# Patient Record
Sex: FEMALE | Race: OTHER | Employment: FULL TIME | URBAN - METROPOLITAN AREA
[De-identification: names, ages, dates, MRNs, and addresses within clinical notes are randomized per-mention and may not be internally consistent; named-entity substitution may affect disease eponyms.]

---

## 2022-06-09 ENCOUNTER — HOSPITAL ENCOUNTER (EMERGENCY)
Age: 22
Discharge: HOME | End: 2022-06-09
Attending: EMERGENCY MEDICINE

## 2022-06-09 VITALS
SYSTOLIC BLOOD PRESSURE: 122 MMHG | DIASTOLIC BLOOD PRESSURE: 72 MMHG | HEART RATE: 72 BPM | RESPIRATION RATE: 16 BRPM | OXYGEN SATURATION: 100 % | WEIGHT: 136.91 LBS | BODY MASS INDEX: 24.26 KG/M2 | TEMPERATURE: 97.7 F | HEIGHT: 63 IN

## 2022-06-09 DIAGNOSIS — N30.90 CYSTITIS: Primary | ICD-10-CM

## 2022-06-09 LAB
ALBUMIN SERPL-MCNC: 3.6 G/DL (ref 3.5–5)
ALBUMIN/GLOB SERPL: 0.8 {RATIO} (ref 1.1–2.2)
ALP SERPL-CCNC: 123 U/L (ref 45–117)
ALT SERPL-CCNC: 23 U/L (ref 12–78)
ANION GAP SERPL CALC-SCNC: 6 MMOL/L (ref 5–15)
APPEARANCE UR: ABNORMAL
AST SERPL-CCNC: 45 U/L (ref 15–37)
BACTERIA URNS QL MICRO: NEGATIVE /HPF
BASOPHILS # BLD: 0.1 K/UL (ref 0–0.1)
BASOPHILS NFR BLD: 1 % (ref 0–1)
BILIRUB SERPL-MCNC: 0.5 MG/DL (ref 0.2–1)
BILIRUB UR QL: NEGATIVE
BUN SERPL-MCNC: 9 MG/DL (ref 6–20)
BUN/CREAT SERPL: 15 (ref 12–20)
CALCIUM SERPL-MCNC: 9.1 MG/DL (ref 8.5–10.1)
CHLORIDE SERPL-SCNC: 106 MMOL/L (ref 97–108)
CO2 SERPL-SCNC: 25 MMOL/L (ref 21–32)
COLOR UR: ABNORMAL
CREAT SERPL-MCNC: 0.6 MG/DL (ref 0.55–1.02)
DIFFERENTIAL METHOD BLD: ABNORMAL
EOSINOPHIL # BLD: 0.2 K/UL (ref 0–0.4)
EOSINOPHIL NFR BLD: 2 % (ref 0–7)
EPITH CASTS URNS QL MICRO: ABNORMAL /LPF
ERYTHROCYTE [DISTWIDTH] IN BLOOD BY AUTOMATED COUNT: 17 % (ref 11.5–14.5)
GLOBULIN SER CALC-MCNC: 4.5 G/DL (ref 2–4)
GLUCOSE SERPL-MCNC: 91 MG/DL (ref 65–100)
GLUCOSE UR STRIP.AUTO-MCNC: NEGATIVE MG/DL
HCG SERPL QL: NEGATIVE
HCG SERPL-ACNC: <1 MIU/ML (ref 0–6)
HCT VFR BLD AUTO: 35.1 % (ref 35–47)
HGB BLD-MCNC: 10.1 G/DL (ref 11.5–16)
HGB UR QL STRIP: ABNORMAL
HYALINE CASTS URNS QL MICRO: ABNORMAL /LPF (ref 0–2)
IMM GRANULOCYTES # BLD AUTO: 0 K/UL (ref 0–0.04)
IMM GRANULOCYTES NFR BLD AUTO: 0 % (ref 0–0.5)
KETONES UR QL STRIP.AUTO: NEGATIVE MG/DL
LEUKOCYTE ESTERASE UR QL STRIP.AUTO: ABNORMAL
LIPASE SERPL-CCNC: 127 U/L (ref 73–393)
LYMPHOCYTES # BLD: 1.7 K/UL (ref 0.8–3.5)
LYMPHOCYTES NFR BLD: 23 % (ref 12–49)
MCH RBC QN AUTO: 19.2 PG (ref 26–34)
MCHC RBC AUTO-ENTMCNC: 28.8 G/DL (ref 30–36.5)
MCV RBC AUTO: 66.9 FL (ref 80–99)
MONOCYTES # BLD: 0.4 K/UL (ref 0–1)
MONOCYTES NFR BLD: 5 % (ref 5–13)
NEUTS SEG # BLD: 5.1 K/UL (ref 1.8–8)
NEUTS SEG NFR BLD: 69 % (ref 32–75)
NITRITE UR QL STRIP.AUTO: NEGATIVE
NRBC # BLD: 0 K/UL (ref 0–0.01)
NRBC BLD-RTO: 0 PER 100 WBC
PH UR STRIP: 7 [PH] (ref 5–8)
PLATELET # BLD AUTO: ABNORMAL K/UL (ref 150–400)
PMV BLD AUTO: ABNORMAL FL (ref 8.9–12.9)
POTASSIUM SERPL-SCNC: 4.1 MMOL/L (ref 3.5–5.1)
PROT SERPL-MCNC: 8.1 G/DL (ref 6.4–8.2)
PROT UR STRIP-MCNC: ABNORMAL MG/DL
RBC # BLD AUTO: 5.25 M/UL (ref 3.8–5.2)
RBC #/AREA URNS HPF: ABNORMAL /HPF (ref 0–5)
RBC MORPH BLD: ABNORMAL
SODIUM SERPL-SCNC: 137 MMOL/L (ref 136–145)
SP GR UR REFRACTOMETRY: <1.005 (ref 1–1.03)
UR CULT HOLD, URHOLD: NORMAL
UROBILINOGEN UR QL STRIP.AUTO: 0.2 EU/DL (ref 0.2–1)
WBC # BLD AUTO: 7.5 K/UL (ref 3.6–11)
WBC URNS QL MICRO: >100 /HPF (ref 0–4)

## 2022-06-09 PROCEDURE — 84702 CHORIONIC GONADOTROPIN TEST: CPT

## 2022-06-09 PROCEDURE — 83690 ASSAY OF LIPASE: CPT

## 2022-06-09 PROCEDURE — 87077 CULTURE AEROBIC IDENTIFY: CPT

## 2022-06-09 PROCEDURE — 87186 SC STD MICRODIL/AGAR DIL: CPT

## 2022-06-09 PROCEDURE — 87086 URINE CULTURE/COLONY COUNT: CPT

## 2022-06-09 PROCEDURE — 84703 CHORIONIC GONADOTROPIN ASSAY: CPT

## 2022-06-09 PROCEDURE — 81001 URINALYSIS AUTO W/SCOPE: CPT

## 2022-06-09 PROCEDURE — 99283 EMERGENCY DEPT VISIT LOW MDM: CPT

## 2022-06-09 PROCEDURE — 85025 COMPLETE CBC W/AUTO DIFF WBC: CPT

## 2022-06-09 PROCEDURE — 36415 COLL VENOUS BLD VENIPUNCTURE: CPT

## 2022-06-09 PROCEDURE — 80053 COMPREHEN METABOLIC PANEL: CPT

## 2022-06-09 RX ORDER — CEPHALEXIN 500 MG/1
500 CAPSULE ORAL 2 TIMES DAILY
Qty: 14 CAPSULE | Refills: 0 | Status: SHIPPED | OUTPATIENT
Start: 2022-06-09 | End: 2022-06-16

## 2022-06-10 NOTE — ED TRIAGE NOTES
Pt arrives to ED for lower abdominal pain. States, \"Feels like my uterus swells up\". Reports burning with urination. Symptoms x 8 days. Denies fevers.        Hungarian interpretor [id number]

## 2022-06-10 NOTE — ED PROVIDER NOTES
Patient is a 78-year-old Thai-speaking female with no past medical history who presents for evaluation of dysuria, abdominal cramping, bloating. She reports that she stopped her birth control approximately 1 month ago. She is currently on her menstrual cycle, but reports her menstrual cycle is typically not painful. She additionally notes that she has been having dysuria, pelvic discomfort, frequency for the past few days. She reports that she was recently treated for a urinary tract infection, but her symptoms returned soon after. She denies any fevers or flank tenderness. No past medical history on file. No past surgical history on file. No family history on file. Social History     Socioeconomic History    Marital status:      Spouse name: Not on file    Number of children: Not on file    Years of education: Not on file    Highest education level: Not on file   Occupational History    Not on file   Tobacco Use    Smoking status: Not on file    Smokeless tobacco: Not on file   Substance and Sexual Activity    Alcohol use: Not on file    Drug use: Not on file    Sexual activity: Not on file   Other Topics Concern    Not on file   Social History Narrative    Not on file     Social Determinants of Health     Financial Resource Strain:     Difficulty of Paying Living Expenses: Not on file   Food Insecurity:     Worried About Running Out of Food in the Last Year: Not on file    Luis of Food in the Last Year: Not on file   Transportation Needs:     Lack of Transportation (Medical): Not on file    Lack of Transportation (Non-Medical):  Not on file   Physical Activity:     Days of Exercise per Week: Not on file    Minutes of Exercise per Session: Not on file   Stress:     Feeling of Stress : Not on file   Social Connections:     Frequency of Communication with Friends and Family: Not on file    Frequency of Social Gatherings with Friends and Family: Not on file  Attends Taoism Services: Not on file    Active Member of Clubs or Organizations: Not on file    Attends Club or Organization Meetings: Not on file    Marital Status: Not on file   Intimate Partner Violence:     Fear of Current or Ex-Partner: Not on file    Emotionally Abused: Not on file    Physically Abused: Not on file    Sexually Abused: Not on file   Housing Stability:     Unable to Pay for Housing in the Last Year: Not on file    Number of [place] in the Last Year: Not on file    Unstable Housing in the Last Year: Not on file         ALLERGIES: Patient has no known allergies. Review of Systems   Constitutional: Negative for unexpected weight change. HENT: Negative for congestion. Eyes: Negative for visual disturbance. Respiratory: Negative for cough, chest tightness and shortness of breath. Cardiovascular: Negative for chest pain. Gastrointestinal: Negative for diarrhea, nausea and vomiting. Endocrine: Negative for polyuria. Genitourinary: Positive for dysuria and frequency. Negative for flank pain. Musculoskeletal: Negative for back pain. Skin: Negative for color change. Allergic/Immunologic: Negative for immunocompromised state. Neurological: Negative for dizziness and headaches. Hematological: Negative for adenopathy. Psychiatric/Behavioral: Negative for agitation. Vitals:    06/09/22 2051   BP: 122/72   Pulse: 72   Resp: 16   Temp: 97.7 °F (36.5 °C)   SpO2: 100%   Weight: 62.1 kg (136 lb 14.5 oz)   Height: 5' 3\" (1.6 m)            Physical Exam  Vitals and nursing note reviewed. Constitutional:       Appearance: She is well-developed and normal weight. HENT:      Head: Atraumatic. Eyes:      Pupils: Pupils are equal, round, and reactive to light. Cardiovascular:      Rate and Rhythm: Normal rate. Pulmonary:      Effort: Pulmonary effort is normal. No respiratory distress. Abdominal:      General: Abdomen is flat.  Bowel sounds are normal. Palpations: Abdomen is soft. Tenderness: There is no abdominal tenderness. There is no right CVA tenderness, left CVA tenderness, guarding or rebound. Hernia: No hernia is present. Skin:     General: Skin is warm and dry. Capillary Refill: Capillary refill takes less than 2 seconds. Neurological:      General: No focal deficit present. Mental Status: She is alert and oriented to person, place, and time. Psychiatric:         Mood and Affect: Mood normal.          MDM  Number of Diagnoses or Management Options  Cystitis  Diagnosis management comments: Patient presenting with dysuria, abdominal cramping, bloating in setting of menstrual cycle. Labs significant for cystitis, urine culture sent off. We will treat patient with oral Keflex. Labs otherwise unremarkable, revealing nonelevated WBC, normal creatinine. Encouraged close PCP follow-up. Discussed my clinical impression(s), any labs and/or radiology results with the patient. I answered any questions and addressed any concerns. Discussed the importance of following up with their primary care physician and/or specialist(s). Discussed signs or symptoms that would warrant return back to the ER for further evaluation. The patient is agreeable with discharge.        Amount and/or Complexity of Data Reviewed  Clinical lab tests: ordered and reviewed           Procedures

## 2022-06-10 NOTE — DISCHARGE INSTRUCTIONS
Thank you for allowing us to provide you with medical care today. We realize that you have many choices for your emergency care needs. We thank you for choosing [facility]. Please choose us in the future for any continued health care needs. The exam and treatment you received in the emergency department were for an emergent problem and are not intended as complete care. It is important that you follow-up with a doctor. If your symptoms worsen or you do not improve should return to the emergency department. We are available 24 hours a day. Please make an appointment with your health care provider for follow-up of your emergency department visit. Take this sheet with you when you go to your follow-up visit.

## 2022-06-12 LAB
BACTERIA SPEC CULT: ABNORMAL
BACTERIA SPEC CULT: ABNORMAL
CC UR VC: ABNORMAL
SERVICE CMNT-IMP: ABNORMAL

## 2022-12-16 ENCOUNTER — OFFICE VISIT (OUTPATIENT)
Dept: FAMILY MEDICINE CLINIC | Age: 22
End: 2022-12-16
Payer: MEDICAID

## 2022-12-16 VITALS
SYSTOLIC BLOOD PRESSURE: 112 MMHG | RESPIRATION RATE: 17 BRPM | DIASTOLIC BLOOD PRESSURE: 65 MMHG | HEIGHT: 63 IN | WEIGHT: 149 LBS | TEMPERATURE: 97.5 F | OXYGEN SATURATION: 99 % | BODY MASS INDEX: 26.4 KG/M2 | HEART RATE: 80 BPM

## 2022-12-16 DIAGNOSIS — B37.31 VAGINAL CANDIDIASIS: ICD-10-CM

## 2022-12-16 DIAGNOSIS — Z3A.22 22 WEEKS GESTATION OF PREGNANCY: Primary | ICD-10-CM

## 2022-12-16 LAB
ABO, EXTERNAL RESULT: NORMAL
C. TRACHOMATIS, EXTERNAL RESULT: NEGATIVE
HEP B, EXTERNAL RESULT: NEGATIVE
HIV, EXTERNAL RESULT: NORMAL
N. GONORRHOEAE, EXTERNAL RESULT: NEGATIVE
RH FACTOR, EXTERNAL RESULT: POSITIVE
RPR, EXTERNAL RESULT: NORMAL
RUBELLA TITER, EXTERNAL RESULT: NORMAL

## 2022-12-16 RX ORDER — ASPIRIN 325 MG
1 TABLET, DELAYED RELEASE (ENTERIC COATED) ORAL
Qty: 7 APPLICATOR | Refills: 0 | Status: SHIPPED | OUTPATIENT
Start: 2022-12-16 | End: 2022-12-19

## 2022-12-16 NOTE — PROGRESS NOTES
[facility] [address] [place] [address]   Office [phone], Fax [phone]    Subjective:     Chief Complaint   Patient presents with    Establish Care    Missed Menses    Vaginal Discharge              HPI:  Dominick Frazier is a 23 y.o. female with a history of C Section delivery that presents for: New patient, pregnancy concerns     Pregnancy:   - LMP 22  - [id number]  - 7year old child,  section, performed due to fetal intolerance at full term. Born in [facility].   - no other medications   - EGA 22w3d by LMP    Vaginal itching:  Patient notes 1 week of mild vaginal itching along with white discharge. Patient denies any dysuria, bleeding, abdominal pain. Patient does note history of previous yeast infections and states this does feel similar. Health Maintenance:  Health Maintenance Due   Topic Date Due    COVID-19 Vaccine (1) Never done    HPV Age 9Y-34Y (1 - 2-dose series) Never done    DTaP/Tdap/Td series (1 - Tdap) Never done    Pap Smear  Never done    Flu Vaccine (1) Never done          Past Medical Hx  I personally reviewed. History reviewed. No pertinent past medical history. SocHx   I personally reviewed.   Social History     Socioeconomic History    Marital status:      Spouse name: Not on file    Number of children: Not on file    Years of education: Not on file    Highest education level: Not on file   Occupational History    Not on file   Tobacco Use    Smoking status: Never    Smokeless tobacco: Never   Substance and Sexual Activity    Alcohol use: Not Currently    Drug use: Never    Sexual activity: Not on file   Other Topics Concern    Not on file   Social History Narrative    Not on file     Social Determinants of Health     Financial Resource Strain: Not on file   Food Insecurity: Not on file   Transportation Needs: Not on file   Physical Activity: Not on file   Stress: Not on file   Social Connections: Not on file   Intimate Partner Violence: Not on file   Housing Stability: Not on file        Allergies  I personally reviewed. No Known Allergies     Medications  I personally reviewed. Current Outpatient Medications on File Prior to Visit   Medication Sig Dispense Refill    PNV No.40-Iron Fum-FA Cmb No.1 27-1 mg tab Take  by mouth. No current facility-administered medications on file prior to visit. ROS:   Review of Systems   Constitutional:  Positive for fever. Eyes:  Negative for pain. Cardiovascular:  Negative for chest pain. Gastrointestinal:  Negative for abdominal pain, nausea and vomiting. Blood in stool: :.  Genitourinary:  Negative for dysuria and hematuria. Vaginal itching, white discharge       Objective:   Vitals  I personally reviewed. Visit Vitals  /65   Pulse 80   Temp 97.5 °F (36.4 °C) (Temporal)   Resp 17   Ht 5' 3\" (1.6 m)   Wt 149 lb (67.6 kg)   LMP 07/12/2022 (Exact Date)   SpO2 99%   BMI 26.39 kg/m²        Physical Exam:   Physical Exam  Constitutional:       Appearance: Normal appearance. Cardiovascular:      Rate and Rhythm: Normal rate and regular rhythm. Pulses: Normal pulses. Heart sounds: Normal heart sounds. Pulmonary:      Effort: Pulmonary effort is normal. No respiratory distress. Breath sounds: Normal breath sounds. No wheezing or rhonchi. Genitourinary:     General: Normal vulva. Comments: Thin white discharge  Neurological:      General: No focal deficit present. Mental Status: She is alert and oriented to person, place, and time. Notable Labs and Imaging:   -Pelvic exam with wet prep showing branching hyphae. - Positive pregnancy test    Assessment/Plan:   Assessment:   26-year-old female presents today with pregnancy concerns, dating by last menstrual period we will place her approximately 25 weeks gestational age. Patient also with acute complaints of vaginal itching and white discharge, wet prep consistent with vaginal candidiasis.       1. 22 weeks gestation of pregnancy  Patient presents today for pregnancy concerns. Positive pregnancy test.  Approximately 22 weeks gestational age. We will check initial OB labs today and schedule for initial OB visit next week. Of note, patient with history of  section in [facility] without records.  - TYPE & SCREEN; Future  - CBC W/O DIFF; Future  - CULTURE, URINE; Future  - Mercedes Beecham / GC-AMPLIFIED; Future  - RUBELLA AB, IGG; Future  - VZV AB, IGG; Future  - HIV 1/2 AG/AB, 4TH GENERATION,W RFLX CONFIRM; Future  - AMB POC URINE PREGNANCY TEST, VISUAL COLOR COMPARISON  - RPR; Future  - HEMOGLOBIN FRACTIONATION; Future  - HEP B SURFACE AG; Future  - HEPATITIS C AB; Future  - HEPATITIS C AB  - HEP B SURFACE AG  - HEMOGLOBIN FRACTIONATION  - RPR  - HIV 1/2 AG/AB, 4TH GENERATION,W RFLX CONFIRM  - VZV AB, IGG  - RUBELLA AB, IGG  - CBC W/O DIFF  - TYPE & SCREEN  - CHLAMYDIA / GC-AMPLIFIED  - CULTURE, URINE    2. Vaginal candidiasis  1 week of itching and thin white discharge. Wet prep consistent with candidiasis. Treat with topical antifungal x 1 week. - AMB POC SMEAR, STAIN & INTERPRET, WET MOUNT        Pt was discussed with Dr Sean Goznalez (attending physician). I have reviewed patient medical and social history and medications. I have reviewed pertinent labs results and other data. I have discussed the diagnosis with the patient and the intended plan as seen in the above orders. The patient has received an after-visit summary and questions were answered concerning future plans. I have discussed medication side effects and warnings with the patient as well.     Chavo Hwang MD  Resident [place] [id number]

## 2022-12-16 NOTE — PROGRESS NOTES
Chief Complaint   Patient presents with    Establish Care    Missed Menses    Vaginal Discharge            1. Have you been to the ER, urgent care clinic since your last visit? Hospitalized since your last visit? N/A    2. Have you seen or consulted any other health care providers outside of the [address] [place] [address] since your last visit? Include any pap smears or colon screening.  N/A

## 2022-12-17 LAB
ABO + RH BLD: NORMAL
BLOOD BANK CMNT PATIENT-IMP: NORMAL
BLOOD GROUP ANTIBODIES SERPL: NORMAL
ERYTHROCYTE [DISTWIDTH] IN BLOOD BY AUTOMATED COUNT: 18.5 % (ref 11.5–14.5)
HBV SURFACE AG SER QL: 0.38 INDEX
HBV SURFACE AG SER QL: NEGATIVE
HCT VFR BLD AUTO: 34.3 % (ref 35–47)
HCV AB SERPL QL IA: NONREACTIVE
HGB BLD-MCNC: 9.9 G/DL (ref 11.5–16)
HIV 1+2 AB+HIV1 P24 AG SERPL QL IA: NONREACTIVE
HIV12 RESULT COMMENT, HHIVC: NORMAL
MCH RBC QN AUTO: 20.8 PG (ref 26–34)
MCHC RBC AUTO-ENTMCNC: 28.9 G/DL (ref 30–36.5)
MCV RBC AUTO: 71.9 FL (ref 80–99)
NRBC # BLD: 0 K/UL (ref 0–0.01)
NRBC BLD-RTO: 0 PER 100 WBC
PLATELET # BLD AUTO: 311 K/UL (ref 150–400)
PMV BLD AUTO: 10.8 FL (ref 8.9–12.9)
RBC # BLD AUTO: 4.77 M/UL (ref 3.8–5.2)
RPR SER QL: NONREACTIVE
RUBV IGG SER-IMP: REACTIVE
RUBV IGG SERPL IA-ACNC: 229.5 IU/ML
SPECIMEN EXP DATE BLD: NORMAL
WBC # BLD AUTO: 6.2 K/UL (ref 3.6–11)

## 2022-12-18 LAB
BACTERIA SPEC CULT: NORMAL
SERVICE CMNT-IMP: NORMAL

## 2022-12-18 NOTE — PROGRESS NOTES
Subjective:   Niki Mares is a 23 y.o.  at 22w6d with ALICE of 23 based on LMP, who is being seen today for her first initial obstetrical visit. Due to language barrier, an  was present during the history-taking and subsequent discussion (and for part of the physical exam) with this patient. This is a planned pregnancy. She is at 22w6d gestation by LMP. See flow sheet for OB history. History of Depression in pregnancy or post partum? No  History of GDM or DM? No  History of GHTN or HTN? No  History of pre-eclampsia? No  History of thyroid disorder? No  History of PTD? No  History of ? YES - post-dates, dysfunctional labor/failure to progress  History of Genetic disorders? No  History of STD's? No  History of abnormal PAP? No  Taking prenatal vitamins? YES    Allergies- reviewed:   No Known Allergies    Medications- reviewed:   Current Outpatient Medications   Medication Sig    ferrous sulfate 325 mg (65 mg iron) tablet Take 1 Tablet by mouth every other day. PNV No.40-Iron Fum-FA Cmb No.1 27-1 mg tab Take  by mouth.    miconazole nitrate (MONISTAT 7 VA) Insert  into vagina. clotrimazole (MYCELEX) 1 % vaginal cream Insert 1 Applicator into vagina nightly for 7 days. (Patient not taking: Reported on 2022)     No current facility-administered medications for this visit. Past Medical History- reviewed:  History reviewed. No pertinent past medical history.     Past Surgical History- reviewed:   Past Surgical History:   Procedure Laterality Date    HX  SECTION         Social History- reviewed:  Social History     Socioeconomic History    Marital status:      Spouse name: Not on file    Number of children: Not on file    Years of education: Not on file    Highest education level: Not on file   Occupational History    Not on file   Tobacco Use    Smoking status: Never    Smokeless tobacco: Never   Substance and Sexual Activity Alcohol use: Not Currently    Drug use: Never    Sexual activity: Not on file   Other Topics Concern    Not on file   Social History Narrative    Not on file     Social Determinants of Health     Financial Resource Strain: Not on file   Food Insecurity: Not on file   Transportation Needs: Not on file   Physical Activity: Not on file   Stress: Not on file   Social Connections: Not on file   Intimate Partner Violence: Not on file   Housing Stability: Not on file         Objective:   Visit Vitals  BP (!) 106/53   Pulse 70   Temp 97.3 °F (36.3 °C) (Temporal)   Resp 17   Ht 5' 3\" (1.6 m)   Wt 66.7 kg (147 lb)   LMP 2022 (Exact Date)   SpO2 100%   BMI 26.04 kg/m²       See physical exam on flowsheet  Pelvic exam chaperoned by Erskin Sandifer, LPN  Fundal height 21 cm,  bpm  Cervix: with thick white discharge c/w active yeast infection    Labs:  No results found for this or any previous visit (from the past 12 hour(s)). Assessment and Plan:         ICD-10-CM ICD-9-CM    1. 22 weeks gestation of pregnancy  Z3A.22 V22.2 PAP (IMAGE GUIDED), LIQUID-BASED      REFERRAL TO MATERNAL FETAL MEDICINE      PAP (IMAGE GUIDED), LIQUID-BASED      2. Anemia during pregnancy in second trimester  O99.012 648.23 ferrous sulfate 325 mg (65 mg iron) tablet          22 y.o.  at 22w6d w/ ALICE 23, here for initial OB visit     PNL: O+, Ab scr neg, Hgb 9.9, Hep B/Hep C neg, RPR/HIV NR, Rubella immune, Ucx neg, GC/CT/Varicella in process. GTT at follow up. Active yeast infection: continue Tioconazole vaginal nightly for seven days  Anemia of pregnancy: begin iron 355 mg every other day  Discussed recommended weight gain (prepreg BMI 26-29, 15-25lb)  Recommended prenatal vitamins daily, will start iron 325 mg every other day for anemia   Recommend exercise in pregnancy at least 3 or more times weekly, mild to moderate intensity. Avoid activities that cause abdominal trauma.     Discussed appropriate diet during pregnancy, foods to avoid and stressed importance of hydration   Discussed optional genetic screening (Yonathan Gonzalez): Yes - pt wants, will have her meet with with [employer] to get Medicaid set up  Request for MFM anatomy scan faxed: Yes  Dating ultrasound done today: No, pt past first trimester - requested dating ultrasound with MFM  Follow up on 1/11/23 with Dr. Georges Chery This Encounter    Estela Glover [facility]     Referral Priority:   Routine     Referral Type:   Consultation     Referral Reason:   Specialty Services Required     Referred to Provider:   Lydia Alvarado MD     Requested Specialty:   Maternal Fetal Medicine Physician     Number of Visits Requested:   1    ferrous sulfate 325 mg (65 mg iron) tablet     Sig: Take 1 Tablet by mouth every other day. Dispense:  180 Tablet     Refill:  0    miconazole nitrate (MONISTAT 7 VA)     Sig: Insert  into vagina. PAP (IMAGE GUIDED), LIQUID-BASED     Standing Status:   Future     Number of Occurrences:   1     Standing Expiration Date:   6/19/2023     Order Specific Question:   Pap Source? Answer:   Cervical     Order Specific Question:   Total Hysterectomy? Answer:   No     Order Specific Question:   Supracervical Hysterectomy? Answer:   No     Order Specific Question:   Post Menopausal?     Answer:   No     Order Specific Question:   Hormone Therapy? Answer:   No     Order Specific Question:   IUD? Answer:   No     Order Specific Question:   Abnormal Bleeding? Answer:   No     Order Specific Question:   Pregnant     Answer:   Yes     Order Specific Question:   Post Partum? Answer:   No           I have discussed the diagnosis with the patient and the intended plan as seen in the above orders. The patient has received an after-visit summary and questions were answered concerning future plans. I have discussed medication side effects and warnings with the patient as well.  Informed pt to return to the office or go to the ER if she experiences vaginal bleeding, vaginal discharge, leaking of fluid, pelvic cramping.       Pt seen and discussed with Dr. James Peng (attending physician)    Aarti Alcantara MD  Family Medicine Resident

## 2022-12-19 ENCOUNTER — INITIAL PRENATAL (OUTPATIENT)
Dept: FAMILY MEDICINE CLINIC | Age: 22
End: 2022-12-19

## 2022-12-19 ENCOUNTER — HOSPITAL ENCOUNTER (OUTPATIENT)
Dept: LAB | Age: 22
Discharge: HOME | End: 2022-12-19

## 2022-12-19 VITALS
OXYGEN SATURATION: 100 % | HEIGHT: 63 IN | DIASTOLIC BLOOD PRESSURE: 53 MMHG | WEIGHT: 147 LBS | HEART RATE: 70 BPM | RESPIRATION RATE: 17 BRPM | TEMPERATURE: 97.3 F | SYSTOLIC BLOOD PRESSURE: 106 MMHG | BODY MASS INDEX: 26.05 KG/M2

## 2022-12-19 DIAGNOSIS — Z3A.22 22 WEEKS GESTATION OF PREGNANCY: Primary | ICD-10-CM

## 2022-12-19 DIAGNOSIS — O99.012 ANEMIA DURING PREGNANCY IN SECOND TRIMESTER: ICD-10-CM

## 2022-12-19 PROCEDURE — 88175 CYTOPATH C/V AUTO FLUID REDO: CPT

## 2022-12-19 RX ORDER — LANOLIN ALCOHOL/MO/W.PET/CERES
325 CREAM (GRAM) TOPICAL EVERY OTHER DAY
Qty: 180 TABLET | Refills: 0 | Status: SHIPPED | OUTPATIENT
Start: 2022-12-19

## 2022-12-19 NOTE — PROGRESS NOTES
Chief Complaint   Patient presents with    Initial Prenatal Visit     . No bleeding or LOF. 22w 6d today. +FM. 1. Have you been to the ER, urgent care clinic since your last visit? Hospitalized since your last visit? No    2. Have you seen or consulted any other health care providers outside of the [address] [place] [address] since your last visit? Include any pap smears or colon screening.  No

## 2022-12-19 NOTE — PROGRESS NOTES
I reviewed with the resident the medical history and the resident's findings on the physical examination. I discussed with the resident the patient's diagnosis and concur with the plan.     15yo  @ 22w6d  IUP: RH pos, GTT next visit, M tomorrow   Hx CS: was induced for postdates and failed to progress and had CS   Anemia: iron

## 2022-12-20 ENCOUNTER — HOSPITAL ENCOUNTER (OUTPATIENT)
Dept: PERINATAL CARE | Age: 22
Discharge: HOME | End: 2022-12-20
Attending: OBSTETRICS & GYNECOLOGY

## 2022-12-20 LAB
HGB A MFR BLD: 98 % (ref 96.4–98.8)
HGB A2 MFR BLD COLUMN CHROM: 2 % (ref 1.8–3.2)
HGB F MFR BLD: 0 % (ref 0–2)
HGB FRACT BLD-IMP: NORMAL
HGB S MFR BLD: 0 %
VZV IGG SER IA-ACNC: 1169 INDEX

## 2022-12-21 LAB
C TRACH RRNA SPEC QL NAA+PROBE: NEGATIVE
N GONORRHOEA RRNA SPEC QL NAA+PROBE: NEGATIVE
SPECIMEN SOURCE: NORMAL

## 2022-12-22 NOTE — PROGRESS NOTES
O+, Ab scr neg, Hgb 9.9, Hep B/Hep C neg, RPR/HIV NR, Rubella immune, Ucx neg. GC neg.    Discuss anemia at follow up

## 2023-01-11 ENCOUNTER — ROUTINE PRENATAL (OUTPATIENT)
Dept: FAMILY MEDICINE CLINIC | Age: 23
End: 2023-01-11
Payer: MEDICAID

## 2023-01-11 VITALS
BODY MASS INDEX: 26.78 KG/M2 | HEIGHT: 63 IN | OXYGEN SATURATION: 100 % | HEART RATE: 75 BPM | RESPIRATION RATE: 18 BRPM | DIASTOLIC BLOOD PRESSURE: 52 MMHG | WEIGHT: 151.13 LBS | SYSTOLIC BLOOD PRESSURE: 104 MMHG | TEMPERATURE: 98 F

## 2023-01-11 DIAGNOSIS — Z3A.19 19 WEEKS GESTATION OF PREGNANCY: Primary | ICD-10-CM

## 2023-01-11 DIAGNOSIS — Z23 ENCOUNTER FOR IMMUNIZATION: ICD-10-CM

## 2023-01-11 NOTE — PROGRESS NOTES
I reviewed with the resident the medical history and the resident's findings on the physical examination. I discussed with the resident the patient's diagnosis and concur with the plan.     15yo  at 19w3d by --/     IUP: RH pos, GTT next visit, MFM  for anatomy  dating changed based on MFM scan 22  Hx C/S: was induced for postdates and failed to progress and had CS  needs delivery counseling   Anemia: iron, HgB 9.9     Estimated Date of Delivery: 23

## 2023-01-11 NOTE — PROGRESS NOTES
Return OB Visit       Subjective:   Ashish Cabrales 23 y.o.   ALICE: 2023, by 16 week scan NOT c/w LMP. GA:  19w3d. Due to language barrier, an  was present during the history-taking and subsequent discussion (and for part of the physical exam) with this patient. LOF: No  Vaginal bleeding: No  Fetal movement (after 20 weeks): Yes  Contractions: No    Pt denies fever, chills, HA, vision disturbances, RUQ pain, chest pain, SOB, N/V/D, constipation, urinary problems, foul smelling vaginal discharge, LE Edema worse than usual.     OB History    Para Term  AB Living   2 1 1     1   SAB IAB Ectopic Molar Multiple Live Births             1      # Outcome Date GA Lbr Yury/2nd Weight Sex Delivery Anes PTL Lv   2 Current            1 Term 11/24/15 41w0d   M CS-Unspec  N GRABIEL      Complications: Dysfunctional Labor       Allergies- reviewed:   No Known Allergies  Medications- reviewed:   Current Outpatient Medications   Medication Sig    ferrous sulfate 325 mg (65 mg iron) tablet Take 1 Tablet by mouth every other day. PNV No.40-Iron Fum-FA Cmb No.1 27-1 mg tab Take  by mouth.    miconazole nitrate (MONISTAT 7 VA) Insert  into vagina. (Patient not taking: Reported on 2023)     No current facility-administered medications for this visit. Past Medical History- reviewed:  History reviewed. No pertinent past medical history.   Past Surgical History- reviewed:   Past Surgical History:   Procedure Laterality Date    HX  SECTION       Social History- reviewed:  Social History     Socioeconomic History    Marital status:      Spouse name: Not on file    Number of children: Not on file    Years of education: Not on file    Highest education level: Not on file   Occupational History    Not on file   Tobacco Use    Smoking status: Never    Smokeless tobacco: Never   Substance and Sexual Activity    Alcohol use: Not Currently    Drug use: Never Sexual activity: Not on file   Other Topics Concern    Not on file   Social History Narrative    Not on file     Social Determinants of Health     Financial Resource Strain: Not on file   Food Insecurity: Not on file   Transportation Needs: Not on file   Physical Activity: Not on file   Stress: Not on file   Social Connections: Not on file   Intimate Partner Violence: Not on file   Housing Stability: Not on file     Immunizations- reviewed:   Immunization History   Administered Date(s) Administered    Influenza, FLUARIX, FLULAVAL, [address] (age 10 mo+) AND AFLURIA, (age 1 y+), PF, 0.5mL 2023         Objective:   Visit Vitals  BP (!) 104/52 (BP 1 Location: Right upper arm, BP Patient Position: Sitting, BP Cuff Size: Adult)   Pulse 75   Temp 98 °F (36.7 °C) (Temporal)   Resp 18   Ht 5' 3\" (1.6 m)   Wt 151 lb 2 oz (68.5 kg)   LMP 2022 (Exact Date)   SpO2 100%   BMI 26.77 kg/m²       Physical Exam:  GENERAL APPEARANCE: alert, well appearing, in no apparent distress  ABDOMEN: gravid, Fundal height 19 cm FHT present at 145 bpm  PSYCH: normal mood and affect     Labs  No results found for this or any previous visit (from the past 12 hour(s)). Assessment         ICD-10-CM ICD-9-CM    1. 19 weeks gestation of pregnancy  Z3A.19 V22.2 INFLUENZA, FLUARIX, FLULAVAL, FLUZONE (AGE 6 MO+), AFLURIA(AGE 3Y+) IM, PF, 0.5 ML      2. Encounter for immunization  Z23 V03.89 INFLUENZA, FLUARIX, FLULAVAL, FLUZONE (AGE 6 MO+), AFLURIA(AGE 3Y+) IM, PF, 0.5 ML            Plan   23 y.o.  19w3d w/ ALICE 2023, by 16-week scan NOT c/w LMP here for return OB visit     SIUP at 19w3d  -PNL: O+, Ab scr neg, Hgb 9.9, Hep B/Hep C neg, RPR/HIV NR, Rubella immune, Ucx neg, GC/CT/Varicella in process. Pap NILM. GTT at 24-28 weeks. -Genetic testing: wll need insurance set up through B  -Immunizations: s/p flu  -Ultrasound: on 23: EFW 48th% w/ normal anatomy. 20-week scan on 23.        PREGNANCY CONCERNS    Yeast infection: Resolved. S/p Tioconazole vaginal nightly for seven days. Anemia of pregnancy: Hgb 9.9.   - Continue iron 325 mg every other day        Follow up on 2/1/23 with Dr. Janeth Mayer Provider: Magdi lau. in labor: TBD  Feeding plan: TBD  Circ if male: TBD  Post partum family planning: TBD  Social: Denies Tobacco, EtoH or illict drugs. Orders Placed This Encounter    Influenza, FLUARIX, FLULAVAL, FLUZONE (age 10 mo+), AFLURIA (age 3y+) IM, PF, 0.5 mL     Labor precautions discussed, including: Regular painful contractions, lasting for greater than one hour, taking your breath away; any vaginal bleeding; any leakage of fluid; or absent or decreased fetal movement. Call M.D. on call if any of these symptoms or signs occur. I have discussed the diagnosis with the patient and the intended plan as seen in the above orders. The patient has received an after-visit summary and questions were answered concerning future plans. I have discussed medication side effects and warnings with the patient as well. Informed pt to return to the office or go to the ER if she experiences vaginal bleeding, vaginal discharge, leaking of fluid, pelvic cramping.     Pt seen and discussed with Dr. Kp Christianson (attending physician)    Ambreen Pa MD  Family Medicine Resident

## 2023-01-11 NOTE — PROGRESS NOTES
Chief Complaint   Patient presents with    Routine Prenatal Visit     Baby move: yes  Vaginal bleeding: no  Contractions: no  Concerns: no     Visit Vitals  BP (!) 104/52 (BP 1 Location: Right upper arm, BP Patient Position: Sitting, BP Cuff Size: Adult)   Pulse 75   Temp 98 °F (36.7 °C) (Temporal)   Resp 18   Ht 5' 3\" (1.6 m)   Wt 151 lb 2 oz (68.5 kg)   SpO2 100%   BMI 26.77 kg/m²     1. Have you been to the ER, urgent care clinic since your last visit? Hospitalized since your last visit? No    2. Have you seen or consulted any other health care providers outside of the [address] [place] [address] since your last visit? Include any pap smears or colon screening.  NO

## 2023-01-17 ENCOUNTER — HOSPITAL ENCOUNTER (OUTPATIENT)
Dept: PERINATAL CARE | Age: 23
Discharge: HOME | End: 2023-01-17
Attending: OBSTETRICS & GYNECOLOGY
Payer: SELF-PAY

## 2023-01-17 PROCEDURE — 76811 OB US DETAILED SNGL FETUS: CPT | Performed by: OBSTETRICS & GYNECOLOGY

## 2023-02-01 ENCOUNTER — ROUTINE PRENATAL (OUTPATIENT)
Dept: FAMILY MEDICINE CLINIC | Age: 23
End: 2023-02-01

## 2023-02-01 VITALS
HEART RATE: 82 BPM | WEIGHT: 154.25 LBS | SYSTOLIC BLOOD PRESSURE: 95 MMHG | DIASTOLIC BLOOD PRESSURE: 57 MMHG | BODY MASS INDEX: 27.32 KG/M2 | OXYGEN SATURATION: 100 %

## 2023-02-01 DIAGNOSIS — Z3A.22 22 WEEKS GESTATION OF PREGNANCY: Primary | ICD-10-CM

## 2023-02-01 NOTE — PROGRESS NOTES
Return OB Visit   This visit was performed with use of [employer],  ID [id number]    Subjective:   Amarilis Rodgers 23 y.o.   ALICE: 2023, by Ultrasound  GA:  22w3d. Concerns today: none    LOF: none  Vaginal bleeding: none  Fetal movement (after 20 weeks): present  Contractions: none    Pt denies fever, chills, HA, vision disturbances, RUQ pain, chest pain, SOB, N/V/D, constipation, urinary problems, foul smelling vaginal discharge, LE Edema worse than usual.     OB History    Para Term  AB Living   2 1 1     1   SAB IAB Ectopic Molar Multiple Live Births             1      # Outcome Date GA Lbr Yury/2nd Weight Sex Delivery Anes PTL Lv   2 Current            1 Term 11/24/15 41w0d   M CS-Unspec  N GRABIEL      Complications: Dysfunctional Labor       Allergies- reviewed:   No Known Allergies  Medications- reviewed:   Current Outpatient Medications   Medication Sig    ferrous sulfate 325 mg (65 mg iron) tablet Take 1 Tablet by mouth every other day. PNV No.40-Iron Fum-FA Cmb No.1 27-1 mg tab Take  by mouth.    miconazole nitrate (MONISTAT 7 VA) Insert  into vagina. (Patient not taking: No sig reported)     No current facility-administered medications for this visit. Past Medical History- reviewed:  History reviewed. No pertinent past medical history.   Past Surgical History- reviewed:   Past Surgical History:   Procedure Laterality Date    HX  SECTION       Social History- reviewed:  Social History     Socioeconomic History    Marital status:      Spouse name: Not on file    Number of children: Not on file    Years of education: Not on file    Highest education level: Not on file   Occupational History    Not on file   Tobacco Use    Smoking status: Never    Smokeless tobacco: Never   Substance and Sexual Activity    Alcohol use: Not Currently    Drug use: Never    Sexual activity: Not on file   Other Topics Concern    Not on file   Social History Narrative    Not on file     Social Determinants of Health     Financial Resource Strain: Not on file   Food Insecurity: Not on file   Transportation Needs: Not on file   Physical Activity: Not on file   Stress: Not on file   Social Connections: Not on file   Intimate Partner Violence: Not on file   Housing Stability: Not on file     Immunizations- reviewed:   Immunization History   Administered Date(s) Administered    Influenza, FLUARIX, FLULAVAL, [address] (age 10 mo+) AND AFLURIA, (age 1 y+), PF, 0.5mL 2023       Objective:   Visit Vitals  BP (!) 95/57 (BP 1 Location: Right arm, BP Patient Position: Sitting, BP Cuff Size: Adult)   Pulse 82   Wt 154 lb 4 oz (70 kg)   LMP 2022 (Exact Date)   SpO2 100%   BMI 27.32 kg/m²       Physical Exam:  GENERAL APPEARANCE: alert, well appearing, in no apparent distress  ABDOMEN: gravid, Fundal height 25cm FHT present at 135  bpm  PSYCH: normal mood and affect     Labs  No results found for this or any previous visit (from the past 12 hour(s)). Assessment         ICD-10-CM ICD-9-CM    1. 22 weeks gestation of pregnancy  Z3A.22 V22.2             Plan   23 y.o.  22w3d ALICE 2023, by Ultrasound here for return OB visit     SIUP at 22w3d by Andreea Guillermo  -PNL: O+, Ab scr neg, Hgb 9.9, Hbfrc wnl. Hep B/Hep C neg, RPR/HIV NR, GC/CT neg, Rubella/VZV immune, Ucx neg. Pap NILM. GTT at 24-28 weeks. -Genetic testing: NIPT today  -Immunizations: s/p flu  -Ultrasound: 20w2d scan: normal anatomy, 3v cord, normal cord insertion, complete posterior placenta previa. - Next MFM appt scheduled for      PREGNANCY CONCERNS     Complete posterior placenta previa: Seen on 20wk US. Results reviewed with pt. Pt given precautions. Pt to call ELIANE if any bleeding.   - Will transfer [facility] to [facility]  appt made for  with Dr. Nilesh Murillo, pt aware of time/location    Yeast infection: Resolved. S/p Tioconazole vaginal nightly for seven days.      Anemia of pregnancy: Hgb 9.9.   - Continue iron 325 mg every other day        Estimated Date of Delivery: 6/4/23  Next appt: 2/16 with Dr. Santiago Dhaliwal  Next [facility] appt: 2/2     BIRTH PLAN  Pain mgmt. in labor: TBD  Feeding plan: TBD  Circ if male: TBD  Post partum family planning: TBD  Social: Denies Tobacco, EtoH or illict drugs. No orders of the defined types were placed in this encounter. Labor precautions discussed, including: Regular painful contractions, lasting for greater than one hour, taking your breath away; any vaginal bleeding; any leakage of fluid; or absent or decreased fetal movement. Call M.D. on call if any of these symptoms or signs occur. I have discussed the diagnosis with the patient and the intended plan as seen in the above orders. The patient has received an after-visit summary and questions were answered concerning future plans. I have discussed medication side effects and warnings with the patient as well. Informed pt to return to the office or go to the ER if she experiences vaginal bleeding, vaginal discharge, leaking of fluid, pelvic cramping.     Pt seen and discussed with Dr. Jordan Bates (attending physician)    Francisca Gonzalez MD  Family Medicine Resident

## 2023-02-01 NOTE — PROGRESS NOTES
Chief Complaint   Patient presents with    Routine Prenatal Visit   Interpretor used Jyoti Gregorykenton [id number]  Patient states she has a little discharge here there an notice it when she whips pr in her undergarments. It doesn't have an odor. Patient identified with 2 patient identifiers (name and D. O. B)  Visit Vitals  BP (!) 95/57 (BP 1 Location: Right arm, BP Patient Position: Sitting, BP Cuff Size: Adult)   Pulse 82   Wt 154 lb 4 oz (70 kg)   SpO2 100%   BMI 27.32 kg/m²      Patient is a  at 22w3d    Leakage of Fluid: NO  Vaginal Bleeding: NO  Fetal Movement if > 20 weeks: YES  Prenatal vitamins: YES  Having Contractions: NO  Pain: NO    Visit Vitals  LMP 2022 (Exact Date)       Immunization History   Administered Date(s) Administered    Influenza, FLUARIX, FLULAVAL, FLUZONE (age 10 mo+) AND AFLURIA, (age 1 y+), PF, 0.5mL 2023       1. Have you been to the ER, urgent care clinic since your last visit? Hospitalized since your last visit? No    2. Have you seen or consulted any other health care providers outside of the [address] [place] [address] since your last visit? Include any pap smears or colon screening.  No

## 2023-02-01 NOTE — PROGRESS NOTES
I reviewed with the resident the medical history and the resident's findings on the physical examination. I discussed with the resident the patient's diagnosis and concur with the plan.     17yo  at 22w3d by --     IUP: RH pos  NIPT/carrier screen today  anatomy okay aside from previa as below   Complete Previa: f/up with MFM tomorrow  transfer of care to Dr. Mendez Mares  at 1pm, counseled to call their office if any bleeding   Hx C/S: was induced for postdates and failed to progress and had C/S   Anemia: iron, HgB 9.9     Estimated Date of Delivery: 23

## 2023-02-02 ENCOUNTER — HOSPITAL ENCOUNTER (OUTPATIENT)
Dept: PERINATAL CARE | Age: 23
Discharge: HOME | End: 2023-02-02
Attending: OBSTETRICS & GYNECOLOGY
Payer: MEDICAID

## 2023-02-02 PROCEDURE — 76816 OB US FOLLOW-UP PER FETUS: CPT | Performed by: OBSTETRICS & GYNECOLOGY

## 2023-02-06 NOTE — PROGRESS NOTES
OB transfer from  Dr. Peña Cordero for complete posterior previa. Ultrasound on 02/03/2023     Findings  Single viable IUP with biometry consistent with her EDC of 6/4/23. Fetal anatomy seen appears WNL including  views of the LVOT and RVOT. AFV appears WNL. Posterior placenta is low-lying at 0.67cm from the os. Maternal anatomy seen appears WNL.

## 2023-02-09 ENCOUNTER — DOCUMENTATION ONLY (OUTPATIENT)
Dept: FAMILY MEDICINE CLINIC | Age: 23
End: 2023-02-09

## 2023-02-16 ENCOUNTER — INITIAL PRENATAL (OUTPATIENT)
Dept: OBGYN CLINIC | Age: 23
End: 2023-02-16
Payer: MEDICAID

## 2023-02-16 VITALS
DIASTOLIC BLOOD PRESSURE: 60 MMHG | SYSTOLIC BLOOD PRESSURE: 100 MMHG | BODY MASS INDEX: 27.82 KG/M2 | WEIGHT: 157 LBS | HEIGHT: 63 IN

## 2023-02-16 DIAGNOSIS — O09.90 SUPERVISION OF HIGH RISK PREGNANCY, ANTEPARTUM: Primary | ICD-10-CM

## 2023-02-16 DIAGNOSIS — O44.02 COMPLETE PLACENTA PREVIA NOS OR WITHOUT HEMORRHAGE, SECOND TRIMESTER: ICD-10-CM

## 2023-02-16 DIAGNOSIS — Z3A.24 24 WEEKS GESTATION OF PREGNANCY: ICD-10-CM

## 2023-02-16 PROCEDURE — 0502F SUBSEQUENT PRENATAL CARE: CPT | Performed by: OBSTETRICS & GYNECOLOGY

## 2023-02-16 RX ORDER — LANOLIN ALCOHOL/MO/W.PET/CERES
325 CREAM (GRAM) TOPICAL
Qty: 90 TABLET | Refills: 4 | Status: SHIPPED | OUTPATIENT
Start: 2023-02-16

## 2023-02-16 NOTE — PROGRESS NOTES
Transfer with complete previa post - hx of CS 2015 for fetal intol to labor at term    Baby moving  NIPTS normal  Will send Horizon  Recheck CBC - pt only taking 2 gummie vit, no iron, Will send both - advised to take both daily  Pelvic rest, call if any bleeding

## 2023-02-16 NOTE — PROGRESS NOTES
Transfer from [facility] 6/4/23 by US  Complete post previa  NIPTS normal  Flu vaccine done  Hx of CS 2015  anemia

## 2023-02-18 LAB
ERYTHROCYTE [DISTWIDTH] IN BLOOD BY AUTOMATED COUNT: 16.3 % (ref 11.7–15.4)
HCT VFR BLD AUTO: 28.2 % (ref 34–46.6)
HGB BLD-MCNC: 8.2 G/DL (ref 11.1–15.9)
MCH RBC QN AUTO: 19.7 PG (ref 26.6–33)
MCHC RBC AUTO-ENTMCNC: 29.1 G/DL (ref 31.5–35.7)
MCV RBC AUTO: 68 FL (ref 79–97)
PLATELET # BLD AUTO: 295 X10E3/UL (ref 150–450)
RBC # BLD AUTO: 4.16 X10E6/UL (ref 3.77–5.28)
WBC # BLD AUTO: 7.2 X10E3/UL (ref 3.4–10.8)

## 2023-02-22 ENCOUNTER — TELEPHONE (OUTPATIENT)
Dept: OBGYN CLINIC | Age: 23
End: 2023-02-22

## 2023-02-22 NOTE — TELEPHONE ENCOUNTER
Contacted [facility] [employer]  #[id number] to advise of severe anemia. She states she is taking the iron and and prenatal vitamins with iron daily. Instructed to eat iron rich foods such as spinach, dark leafy vegetables, and red meat. Also reminded of appointments on 3/17. She verbalizes understanding.

## 2023-03-17 ENCOUNTER — ROUTINE PRENATAL (OUTPATIENT)
Dept: OBGYN CLINIC | Age: 23
End: 2023-03-17

## 2023-03-17 VITALS — WEIGHT: 164 LBS | SYSTOLIC BLOOD PRESSURE: 108 MMHG | DIASTOLIC BLOOD PRESSURE: 65 MMHG | BODY MASS INDEX: 29.05 KG/M2

## 2023-03-17 DIAGNOSIS — Z3A.28 28 WEEKS GESTATION OF PREGNANCY: ICD-10-CM

## 2023-03-17 DIAGNOSIS — O44.03 PLACENTA PREVIA IN THIRD TRIMESTER: ICD-10-CM

## 2023-03-17 DIAGNOSIS — O09.90 SUPERVISION OF HIGH RISK PREGNANCY, ANTEPARTUM: Primary | ICD-10-CM

## 2023-03-17 DIAGNOSIS — D50.9 IRON DEFICIENCY ANEMIA, UNSPECIFIED IRON DEFICIENCY ANEMIA TYPE: ICD-10-CM

## 2023-03-17 NOTE — PROGRESS NOTES
Baby moving  Doing well  No MFM fu until 36 weeks - will have them see her early April to assess placenta  glucola and tdap today  Iron panel today

## 2023-03-17 NOTE — PROGRESS NOTES
Transfer from [facility] 6/4/23 by US  Complete post previa  NIPTS normal  Horizon normal   Flu vaccine done  Hx of CS 2015  Anemia 8.2 - needs iron profile, consider hematology

## 2023-03-18 LAB
ERYTHROCYTE [DISTWIDTH] IN BLOOD BY AUTOMATED COUNT: 26.3 % (ref 11.7–15.4)
FERRITIN SERPL-MCNC: 20 NG/ML (ref 15–150)
GLUCOSE 1H P 50 G GLC PO SERPL-MCNC: 148 MG/DL (ref 70–139)
HCT VFR BLD AUTO: 32.1 % (ref 34–46.6)
HGB BLD-MCNC: 9.6 G/DL (ref 11.1–15.9)
IRON SATN MFR SERPL: 7 % (ref 15–55)
IRON SERPL-MCNC: 37 UG/DL (ref 27–159)
MCH RBC QN AUTO: 22.1 PG (ref 26.6–33)
MCHC RBC AUTO-ENTMCNC: 29.9 G/DL (ref 31.5–35.7)
MCV RBC AUTO: 74 FL (ref 79–97)
PLATELET # BLD AUTO: 239 X10E3/UL (ref 150–450)
RBC # BLD AUTO: 4.35 X10E6/UL (ref 3.77–5.28)
TIBC SERPL-MCNC: 495 UG/DL (ref 250–450)
UIBC SERPL-MCNC: 458 UG/DL (ref 131–425)
WBC # BLD AUTO: 5.2 X10E3/UL (ref 3.4–10.8)

## 2023-03-27 ENCOUNTER — TELEPHONE (OUTPATIENT)
Dept: OBGYN CLINIC | Age: 23
End: 2023-03-27

## 2023-03-27 DIAGNOSIS — O09.90 SUPERVISION OF HIGH RISK PREGNANCY, ANTEPARTUM: ICD-10-CM

## 2023-03-27 NOTE — TELEPHONE ENCOUNTER
Contacted Lyn Maddox via [facility]  #[phone]. Advised of low iron and iron/pnv instructions.  Also advised of elevated 1 hour glucose and need for 3 hour glucose test.  Scheduled 3/31 at 9:00 am. She has an appointment with Dr. Mike Okeefe at 10:30 am.

## 2023-03-31 ENCOUNTER — ROUTINE PRENATAL (OUTPATIENT)
Dept: OBGYN CLINIC | Age: 23
End: 2023-03-31

## 2023-03-31 ENCOUNTER — LAB ONLY (OUTPATIENT)
Dept: OBGYN CLINIC | Age: 23
End: 2023-03-31

## 2023-03-31 VITALS — DIASTOLIC BLOOD PRESSURE: 64 MMHG | WEIGHT: 168 LBS | BODY MASS INDEX: 29.76 KG/M2 | SYSTOLIC BLOOD PRESSURE: 104 MMHG

## 2023-03-31 DIAGNOSIS — O09.90 SUPERVISION OF HIGH RISK PREGNANCY, ANTEPARTUM: Primary | ICD-10-CM

## 2023-03-31 DIAGNOSIS — R73.09 ELEVATED GLUCOSE TOLERANCE TEST: Primary | ICD-10-CM

## 2023-03-31 DIAGNOSIS — R73.09 ELEVATED GLUCOSE TOLERANCE TEST: ICD-10-CM

## 2023-03-31 DIAGNOSIS — O44.03 PLACENTA PREVIA IN THIRD TRIMESTER: ICD-10-CM

## 2023-03-31 PROCEDURE — 0502F SUBSEQUENT PRENATAL CARE: CPT | Performed by: OBSTETRICS & GYNECOLOGY

## 2023-03-31 RX ORDER — ISOPROPYL ALCOHOL 70 ML/100ML
SWAB TOPICAL
Qty: 200 PAD | Refills: 4 | Status: SHIPPED | OUTPATIENT
Start: 2023-03-31

## 2023-03-31 RX ORDER — IBUPROFEN 200 MG
CAPSULE ORAL
Qty: 200 STRIP | Refills: 5 | Status: SHIPPED | OUTPATIENT
Start: 2023-03-31

## 2023-03-31 RX ORDER — LANCETS
EACH MISCELLANEOUS
Qty: 200 EACH | Refills: 4 | Status: SHIPPED | OUTPATIENT
Start: 2023-03-31

## 2023-03-31 RX ORDER — INSULIN PUMP SYRINGE, 3 ML
EACH MISCELLANEOUS
Qty: 1 KIT | Refills: 0 | Status: SHIPPED | OUTPATIENT
Start: 2023-03-31

## 2023-03-31 NOTE — PROGRESS NOTES
Transfer from [facility] 6/4/23 by US  Complete post previa  NIPTS normal  Horizon normal   Flu vaccine done  Hx of CS 2015  Anemia 8.2 - needs iron profile, consider hematology   Iron sat 7   1 hour glucola 148

## 2023-03-31 NOTE — PROGRESS NOTES
Baby moving  Sees MFM next week to check placenta, no bleeding  Taking iron bid   Looks like PUPPPS on abdomen - advice given  -if sx worsen consider steroid pack      Threw up the 3hr GTT, will send the fasting  Start BS testing FBS and 1 hr pp - advised to show her BS to M on 4/3

## 2023-04-01 LAB — GLUCOSE SERPL-MCNC: 70 MG/DL (ref 70–99)

## 2023-04-03 ENCOUNTER — HOSPITAL ENCOUNTER (OUTPATIENT)
Dept: PERINATAL CARE | Age: 23
End: 2023-04-03
Attending: OBSTETRICS & GYNECOLOGY

## 2023-04-03 NOTE — PROCEDURES
Indication  ========    Low lying placenta - resolved    Method  ======    Transabdominal ultrasound examination. View: Good view    Pregnancy  =========    Umana pregnancy. Number of fetuses: 1    Dating  ======    LMP on: 7/12/2022  GA by LMP 40 w + 6 d  ALICE by LMP: 4/18/2023  Ultrasound examination on: 4/3/2023  GA by U/S based upon: [facility], BPD, Femur, HC  GA by U/S 28 w + 1 d  ALICE by U/S: 5/28/2023  Assigned: based on ultrasound (AC, BPD, Femur, HC), selected on 12/20/2022  Assigned GA 31 w + 1 d  Assigned ALICE: 6/4/2023    Fetal Biometry  ============    Standard  BPD 78.4 mm 31w 3d 49% Hadlock  .3 mm 34w 1d 97% Tello  .8 mm 32w 3d 49% Hadlock  Cerebellum tr 39.2 mm 31w 5d 48% Hill  .4 mm 33w 2d 94% Hadlock  Femur 60.7 mm 31w 4d 47% Hadlock  EFW 1,988 g 32w 1d 81% Hadlock  EFW (lb) 4 lb  EFW (oz) 6 oz  EFW by: Hadlock (BPD-HC-AC-FL)  Extended   4.3 mm  CM 7.2 mm  51% Nicolaides  Other Structures   bpm    General Evaluation  ==============    Cardiac activity present.  bpm. Fetal movements: visualized. Presentation: cephalic  Placenta: Placental site: left lateral  Umbilical cord: 3 vessel cord  Amniotic fluid: Amount of AF: normal amount. MVP 5.4 cm. EBER 15.9 cm. Q1 3.4 cm, Q2 4.1 cm, Q3 5.4 cm, Q4 3.0 cm    Fetal Anatomy  ===========    Cranium: normal  Lateral ventricles: normal  Midline falx: normal  Cavum septi pellucidi: normal  Cerebellum: normal  Cisterna magna: normal  4-chamber view: normal  3-vessel view: normal  Stomach: normal  Kidneys: normal  Bladder: normal  Fetal sex: female    Findings  =======    Follow up ultrasound (05442)    This is a umana pregnancy with biometry consistent with prior dating. Anatomy appears normal as noted above. Normal fluid and fetal movement are noted. Growth is appropriate for gestational age at 81%. The placenta is now more than 2 cm from the internal cervical os. The low lying placenta has resolved.     Plan of Care  ==========    Franci Acuna was not able to tolerate a 3 hour GTT. She brought in 2 days worth of blood sugar checks. Her fasting blood sugars are low 90s. Her postprandial blood sugars are  low 100s. She does not have gestational diabetes. Follow-up  ========    No Maternal Fetal Medicine follow up is indicated. We will gladly see your patient as clinically indicated.     Coding  ======    Code: O44.43  Description: Low lying placenta NOS or without hemorrhage  Code: 79312  Description: Ultrasound, pregnant uterus, real time with image documentation, follow up, transabdominal approach per fetus

## 2023-04-07 ENCOUNTER — TELEPHONE (OUTPATIENT)
Dept: OBGYN CLINIC | Age: 23
End: 2023-04-07

## 2023-04-18 ENCOUNTER — ROUTINE PRENATAL (OUTPATIENT)
Dept: OBGYN CLINIC | Age: 23
End: 2023-04-18
Payer: MEDICAID

## 2023-04-18 VITALS — SYSTOLIC BLOOD PRESSURE: 94 MMHG | WEIGHT: 168.6 LBS | DIASTOLIC BLOOD PRESSURE: 61 MMHG | BODY MASS INDEX: 29.87 KG/M2

## 2023-04-18 DIAGNOSIS — O09.90 SUPERVISION OF HIGH RISK PREGNANCY, ANTEPARTUM: Primary | ICD-10-CM

## 2023-04-18 PROCEDURE — 0502F SUBSEQUENT PRENATAL CARE: CPT | Performed by: OBSTETRICS & GYNECOLOGY

## 2023-04-18 NOTE — PROGRESS NOTES
Problem List  Date Reviewed: 3/31/2023          Codes Class Noted    Supervision of high risk pregnancy, antepartum ICD-10-CM: O09.90  ICD-9-CM: V23.9  2/16/2023    Overview Addendum 4/3/2023  4:39 PM by Ettie Collet, MD     Transfer from [facility] 6/4/23 by US  Complete post previa - resolved  NIPTS normal  Horizon normal   Flu vaccine done  Hx of CS 2015  Anemia 8.2 - needs iron profile, consider hematology   Iron sat 7   1 hour glucola 148 - vomited 3hr, checked BS and per MFM no GDM                 Complete placenta previa nos or without hemorrhage, second trimester ICD-10-CM: O44.02  ICD-9-CM: 641.03  2/16/2023

## 2023-05-05 ENCOUNTER — ROUTINE PRENATAL (OUTPATIENT)
Dept: OBGYN CLINIC | Age: 23
End: 2023-05-05
Payer: MEDICAID

## 2023-05-05 VITALS — WEIGHT: 172.4 LBS | BODY MASS INDEX: 30.54 KG/M2 | DIASTOLIC BLOOD PRESSURE: 62 MMHG | SYSTOLIC BLOOD PRESSURE: 97 MMHG

## 2023-05-05 DIAGNOSIS — O09.90 SUPERVISION OF HIGH RISK PREGNANCY, ANTEPARTUM: Primary | ICD-10-CM

## 2023-05-05 DIAGNOSIS — D50.9 IRON DEFICIENCY ANEMIA, UNSPECIFIED IRON DEFICIENCY ANEMIA TYPE: ICD-10-CM

## 2023-05-05 PROCEDURE — 0502F SUBSEQUENT PRENATAL CARE: CPT | Performed by: OBSTETRICS & GYNECOLOGY

## 2023-05-10 PROBLEM — O09.90 SUPERVISION OF HIGH RISK PREGNANCY, ANTEPARTUM: Status: ACTIVE | Noted: 2023-02-16

## 2023-05-12 ENCOUNTER — ROUTINE PRENATAL (OUTPATIENT)
Age: 23
End: 2023-05-12

## 2023-05-12 VITALS — WEIGHT: 173.6 LBS | SYSTOLIC BLOOD PRESSURE: 96 MMHG | BODY MASS INDEX: 30.75 KG/M2 | DIASTOLIC BLOOD PRESSURE: 59 MMHG

## 2023-05-12 DIAGNOSIS — D50.9 IRON DEFICIENCY ANEMIA, UNSPECIFIED IRON DEFICIENCY ANEMIA TYPE: ICD-10-CM

## 2023-05-12 DIAGNOSIS — O09.90 SUPERVISION OF HIGH RISK PREGNANCY, ANTEPARTUM: Primary | ICD-10-CM

## 2023-05-12 DIAGNOSIS — Z3A.36 36 WEEKS GESTATION OF PREGNANCY: ICD-10-CM

## 2023-05-12 LAB
GBS, EXTERNAL RESULT: POSITIVE
HEMOGLOBIN, POC: 10.5 G/DL

## 2023-05-12 NOTE — PROGRESS NOTES
Patient Active Problem List    Diagnosis Date Noted    Supervision of high risk pregnancy, antepartum 02/16/2023     Transfer from [facility] 6/4/23 by US  Complete post previa - resolved  NIPTS normal  Horizon normal   Flu vaccine done  Hx of CS 2015  Anemia 8.2 - needs iron profile, consider hematology   Iron sat 7   1 hour glucola 148 - vomited 3hr, checked BS and per MFM no GDM        Complete placenta previa nos or without hemorrhage, second trimester 02/16/2023

## 2023-05-12 NOTE — CONSULTS
Session ID: [id number]  Request DD:[id number]  Language:Bangladeshi  Status:Fulfilled  Agent ID:#[id number]  Agent Name:Kassidy

## 2023-05-14 LAB — GP B STREP DNA SPEC QL NAA+PROBE: POSITIVE

## 2023-05-19 ENCOUNTER — ROUTINE PRENATAL (OUTPATIENT)
Age: 23
End: 2023-05-19

## 2023-05-19 VITALS — SYSTOLIC BLOOD PRESSURE: 103 MMHG | BODY MASS INDEX: 30.68 KG/M2 | WEIGHT: 173.2 LBS | DIASTOLIC BLOOD PRESSURE: 64 MMHG

## 2023-05-19 DIAGNOSIS — Z3A.37 37 WEEKS GESTATION OF PREGNANCY: ICD-10-CM

## 2023-05-19 DIAGNOSIS — O09.90 SUPERVISION OF HIGH RISK PREGNANCY, ANTEPARTUM: Primary | ICD-10-CM

## 2023-05-19 NOTE — PROGRESS NOTES
Baby moving  GBS pos    Patient had CS after postdates IOL  Cervix completely unfavorable.  Disc risk of IOL post CS  If does not labor before 6/5 posted for CS at 730am. Aware needs to be here at 530am

## 2023-05-19 NOTE — PROGRESS NOTES
Patient Active Problem List    Diagnosis Date Noted    Supervision of high risk pregnancy, antepartum 02/16/2023     Transfer from [facility] 6/4/23 by US  Complete post previa - resolved  NIPTS normal  Horizon normal   Flu vaccine done  Hx of CS 2015  Anemia 8.2 - needs iron profile, consider hematology   Iron sat 7   1 hour glucola 148 - vomited 3hr, checked BS and per MFM no GDM  GBS- pos        Complete placenta previa nos or without hemorrhage, second trimester 02/16/2023

## 2023-05-19 NOTE — CONSULTS
Session ID: [id number]  Request ID: [id number]  Language: Irish  Status: Fulfilled   ID: #[id number]   Name: Roselyn Avalos

## 2023-05-25 ENCOUNTER — ROUTINE PRENATAL (OUTPATIENT)
Age: 23
End: 2023-05-25

## 2023-05-25 VITALS — BODY MASS INDEX: 30.82 KG/M2 | SYSTOLIC BLOOD PRESSURE: 108 MMHG | DIASTOLIC BLOOD PRESSURE: 78 MMHG | WEIGHT: 174 LBS

## 2023-05-25 DIAGNOSIS — O09.90 SUPERVISION OF HIGH RISK PREGNANCY, ANTEPARTUM: Primary | ICD-10-CM

## 2023-05-25 DIAGNOSIS — Z3A.38 38 WEEKS GESTATION OF PREGNANCY: ICD-10-CM

## 2023-05-25 PROCEDURE — 0502F SUBSEQUENT PRENATAL CARE: CPT | Performed by: OBSTETRICS & GYNECOLOGY

## 2023-05-25 NOTE — PROGRESS NOTES
Patient Active Problem List    Diagnosis Date Noted    Supervision of high risk pregnancy, antepartum 02/16/2023     Transfer from [facility] 6/4/23 by US  Complete post previa - resolved  NIPTS normal  Horizon normal   Flu vaccine done  Hx of CS 2015  Anemia 8.2 - needs iron profile, consider hematology   Iron sat 7   1 hour glucola 148 - vomited 3hr, checked BS and per MFM no GDM  GBS- pos        Complete placenta previa nos or without hemorrhage, second trimester 02/16/2023

## 2023-05-25 NOTE — CONSULTS
Session ID: [id number]  Request ID: [id number]  Language: Bengali  Status: Fulfilled   ID: #[id number]   Name: Citlali Joshi

## 2023-06-01 ENCOUNTER — ROUTINE PRENATAL (OUTPATIENT)
Age: 23
End: 2023-06-01

## 2023-06-01 VITALS — BODY MASS INDEX: 30.93 KG/M2 | WEIGHT: 174.6 LBS | SYSTOLIC BLOOD PRESSURE: 99 MMHG | DIASTOLIC BLOOD PRESSURE: 66 MMHG

## 2023-06-01 DIAGNOSIS — O09.90 SUPERVISION OF HIGH RISK PREGNANCY, ANTEPARTUM: Primary | ICD-10-CM

## 2023-06-01 DIAGNOSIS — Z3A.39 39 WEEKS GESTATION OF PREGNANCY: ICD-10-CM

## 2023-06-01 PROCEDURE — 0502F SUBSEQUENT PRENATAL CARE: CPT | Performed by: OBSTETRICS & GYNECOLOGY

## 2023-06-01 NOTE — CONSULTS
Session ID: [id number]  Request ID: [id number]  Language: Greek  Status: Fulfilled   ID: #[id number]   Name: Amber Graham

## 2023-06-02 ENCOUNTER — PREP FOR PROCEDURE (OUTPATIENT)
Age: 23
End: 2023-06-02

## 2023-06-02 RX ORDER — SODIUM CHLORIDE 0.9 % (FLUSH) 0.9 %
10 SYRINGE (ML) INJECTION PRN
Status: CANCELLED | OUTPATIENT
Start: 2023-06-02

## 2023-06-02 RX ORDER — SODIUM CHLORIDE, SODIUM LACTATE, POTASSIUM CHLORIDE, AND CALCIUM CHLORIDE .6; .31; .03; .02 G/100ML; G/100ML; G/100ML; G/100ML
1000 INJECTION, SOLUTION INTRAVENOUS ONCE
Status: CANCELLED | OUTPATIENT
Start: 2023-06-02 | End: 2023-06-02

## 2023-06-02 RX ORDER — ONDANSETRON 2 MG/ML
4 INJECTION INTRAMUSCULAR; INTRAVENOUS EVERY 6 HOURS PRN
Status: CANCELLED | OUTPATIENT
Start: 2023-06-02

## 2023-06-02 RX ORDER — SODIUM CHLORIDE 9 MG/ML
INJECTION, SOLUTION INTRAVENOUS PRN
Status: CANCELLED | OUTPATIENT
Start: 2023-06-02

## 2023-06-02 RX ORDER — SODIUM CHLORIDE, SODIUM LACTATE, POTASSIUM CHLORIDE, CALCIUM CHLORIDE 600; 310; 30; 20 MG/100ML; MG/100ML; MG/100ML; MG/100ML
INJECTION, SOLUTION INTRAVENOUS CONTINUOUS
Status: CANCELLED | OUTPATIENT
Start: 2023-06-02

## 2023-06-02 RX ORDER — SODIUM CHLORIDE 0.9 % (FLUSH) 0.9 %
10 SYRINGE (ML) INJECTION EVERY 12 HOURS SCHEDULED
Status: CANCELLED | OUTPATIENT
Start: 2023-06-02

## 2023-06-05 ENCOUNTER — ANESTHESIA (OUTPATIENT)
Facility: HOSPITAL | Age: 23
DRG: 540 | End: 2023-06-05
Payer: MEDICAID

## 2023-06-05 ENCOUNTER — HOSPITAL ENCOUNTER (INPATIENT)
Facility: HOSPITAL | Age: 23
LOS: 2 days | Discharge: HOME | DRG: 540 | End: 2023-06-07
Attending: OBSTETRICS & GYNECOLOGY | Admitting: OBSTETRICS & GYNECOLOGY
Payer: MEDICAID

## 2023-06-05 ENCOUNTER — ANESTHESIA EVENT (OUTPATIENT)
Facility: HOSPITAL | Age: 23
DRG: 540 | End: 2023-06-05
Payer: MEDICAID

## 2023-06-05 DIAGNOSIS — O48.0 POST TERM PREGNANCY OVER 40 WEEKS: Primary | ICD-10-CM

## 2023-06-05 LAB
ABO + RH BLD: NORMAL
BLOOD GROUP ANTIBODIES SERPL: NORMAL
ERYTHROCYTE [DISTWIDTH] IN BLOOD BY AUTOMATED COUNT: 13.9 % (ref 11.5–14.5)
HCT VFR BLD AUTO: 40.7 % (ref 35–47)
HGB BLD-MCNC: 13.9 G/DL (ref 11.5–16)
MCH RBC QN AUTO: 28.1 PG (ref 26–34)
MCHC RBC AUTO-ENTMCNC: 34.2 G/DL (ref 30–36.5)
MCV RBC AUTO: 82.4 FL (ref 80–99)
NRBC # BLD: 0 K/UL (ref 0–0.01)
NRBC BLD-RTO: 0 PER 100 WBC
PLATELET # BLD AUTO: 176 K/UL (ref 150–400)
PMV BLD AUTO: 10.4 FL (ref 8.9–12.9)
RBC # BLD AUTO: 4.94 M/UL (ref 3.8–5.2)
SPECIMEN EXP DATE BLD: NORMAL
WBC # BLD AUTO: 6.2 K/UL (ref 3.6–11)

## 2023-06-05 PROCEDURE — 6360000002 HC RX W HCPCS: Performed by: NURSE ANESTHETIST, CERTIFIED REGISTERED

## 2023-06-05 PROCEDURE — 2580000003 HC RX 258: Performed by: OBSTETRICS & GYNECOLOGY

## 2023-06-05 PROCEDURE — 3700000000 HC ANESTHESIA ATTENDED CARE: Performed by: OBSTETRICS & GYNECOLOGY

## 2023-06-05 PROCEDURE — 2580000003 HC RX 258: Performed by: NURSE ANESTHETIST, CERTIFIED REGISTERED

## 2023-06-05 PROCEDURE — 86850 RBC ANTIBODY SCREEN: CPT

## 2023-06-05 PROCEDURE — 86900 BLOOD TYPING SEROLOGIC ABO: CPT

## 2023-06-05 PROCEDURE — 85027 COMPLETE CBC AUTOMATED: CPT

## 2023-06-05 PROCEDURE — 2500000003 HC RX 250 WO HCPCS: Performed by: NURSE ANESTHETIST, CERTIFIED REGISTERED

## 2023-06-05 PROCEDURE — 86901 BLOOD TYPING SEROLOGIC RH(D): CPT

## 2023-06-05 PROCEDURE — 36415 COLL VENOUS BLD VENIPUNCTURE: CPT

## 2023-06-05 PROCEDURE — 2720000010 HC SURG SUPPLY STERILE: Performed by: OBSTETRICS & GYNECOLOGY

## 2023-06-05 PROCEDURE — 6360000002 HC RX W HCPCS: Performed by: OBSTETRICS & GYNECOLOGY

## 2023-06-05 PROCEDURE — 3700000001 HC ADD 15 MINUTES (ANESTHESIA): Performed by: OBSTETRICS & GYNECOLOGY

## 2023-06-05 PROCEDURE — 7100000001 HC PACU RECOVERY - ADDTL 15 MIN: Performed by: OBSTETRICS & GYNECOLOGY

## 2023-06-05 PROCEDURE — 59510 CESAREAN DELIVERY: CPT | Performed by: OBSTETRICS & GYNECOLOGY

## 2023-06-05 PROCEDURE — 3609079900 HC CESAREAN SECTION: Performed by: OBSTETRICS & GYNECOLOGY

## 2023-06-05 PROCEDURE — 2709999900 HC NON-CHARGEABLE SUPPLY: Performed by: OBSTETRICS & GYNECOLOGY

## 2023-06-05 PROCEDURE — 1100000000 HC RM PRIVATE

## 2023-06-05 PROCEDURE — 7100000000 HC PACU RECOVERY - FIRST 15 MIN: Performed by: OBSTETRICS & GYNECOLOGY

## 2023-06-05 RX ORDER — SODIUM CHLORIDE 0.9 % (FLUSH) 0.9 %
10 SYRINGE (ML) INJECTION PRN
Status: DISCONTINUED | OUTPATIENT
Start: 2023-06-05 | End: 2023-06-05

## 2023-06-05 RX ORDER — KETOROLAC TROMETHAMINE 30 MG/ML
INJECTION, SOLUTION INTRAMUSCULAR; INTRAVENOUS PRN
Status: DISCONTINUED | OUTPATIENT
Start: 2023-06-05 | End: 2023-06-05 | Stop reason: SDUPTHER

## 2023-06-05 RX ORDER — FAMOTIDINE 20 MG/1
20 TABLET, FILM COATED ORAL 2 TIMES DAILY PRN
Status: DISCONTINUED | OUTPATIENT
Start: 2023-06-05 | End: 2023-06-07 | Stop reason: HOSPADM

## 2023-06-05 RX ORDER — DIPHENHYDRAMINE HYDROCHLORIDE 50 MG/ML
25 INJECTION INTRAMUSCULAR; INTRAVENOUS EVERY 6 HOURS PRN
Status: DISCONTINUED | OUTPATIENT
Start: 2023-06-05 | End: 2023-06-07 | Stop reason: HOSPADM

## 2023-06-05 RX ORDER — SIMETHICONE 80 MG
80 TABLET,CHEWABLE ORAL EVERY 6 HOURS PRN
Status: DISCONTINUED | OUTPATIENT
Start: 2023-06-05 | End: 2023-06-07 | Stop reason: HOSPADM

## 2023-06-05 RX ORDER — ONDANSETRON 4 MG/1
8 TABLET, ORALLY DISINTEGRATING ORAL EVERY 8 HOURS PRN
Status: DISCONTINUED | OUTPATIENT
Start: 2023-06-05 | End: 2023-06-07 | Stop reason: HOSPADM

## 2023-06-05 RX ORDER — SODIUM CHLORIDE, SODIUM LACTATE, POTASSIUM CHLORIDE, CALCIUM CHLORIDE 600; 310; 30; 20 MG/100ML; MG/100ML; MG/100ML; MG/100ML
INJECTION, SOLUTION INTRAVENOUS CONTINUOUS
Status: DISCONTINUED | OUTPATIENT
Start: 2023-06-05 | End: 2023-06-05

## 2023-06-05 RX ORDER — OXYTOCIN 10 [USP'U]/ML
INJECTION, SOLUTION INTRAMUSCULAR; INTRAVENOUS PRN
Status: DISCONTINUED | OUTPATIENT
Start: 2023-06-05 | End: 2023-06-05 | Stop reason: SDUPTHER

## 2023-06-05 RX ORDER — SODIUM CHLORIDE, SODIUM LACTATE, POTASSIUM CHLORIDE, AND CALCIUM CHLORIDE .6; .31; .03; .02 G/100ML; G/100ML; G/100ML; G/100ML
1000 INJECTION, SOLUTION INTRAVENOUS ONCE
Status: COMPLETED | OUTPATIENT
Start: 2023-06-05 | End: 2023-06-05

## 2023-06-05 RX ORDER — SODIUM CHLORIDE 9 MG/ML
INJECTION, SOLUTION INTRAVENOUS PRN
Status: DISCONTINUED | OUTPATIENT
Start: 2023-06-05 | End: 2023-06-05

## 2023-06-05 RX ORDER — FENTANYL CITRATE 50 UG/ML
INJECTION, SOLUTION INTRAMUSCULAR; INTRAVENOUS PRN
Status: DISCONTINUED | OUTPATIENT
Start: 2023-06-05 | End: 2023-06-05 | Stop reason: SDUPTHER

## 2023-06-05 RX ORDER — ONDANSETRON 2 MG/ML
4 INJECTION INTRAMUSCULAR; INTRAVENOUS
Status: DISCONTINUED | OUTPATIENT
Start: 2023-06-05 | End: 2023-06-05

## 2023-06-05 RX ORDER — TRANEXAMIC ACID 10 MG/ML
1000 INJECTION, SOLUTION INTRAVENOUS
Status: ACTIVE | OUTPATIENT
Start: 2023-06-05 | End: 2023-06-06

## 2023-06-05 RX ORDER — SODIUM CHLORIDE, SODIUM LACTATE, POTASSIUM CHLORIDE, CALCIUM CHLORIDE 600; 310; 30; 20 MG/100ML; MG/100ML; MG/100ML; MG/100ML
INJECTION, SOLUTION INTRAVENOUS CONTINUOUS
Status: DISCONTINUED | OUTPATIENT
Start: 2023-06-05 | End: 2023-06-07 | Stop reason: HOSPADM

## 2023-06-05 RX ORDER — SODIUM CHLORIDE 0.9 % (FLUSH) 0.9 %
10 SYRINGE (ML) INJECTION EVERY 12 HOURS SCHEDULED
Status: DISCONTINUED | OUTPATIENT
Start: 2023-06-05 | End: 2023-06-05

## 2023-06-05 RX ORDER — EPHEDRINE SULFATE 50 MG/ML
INJECTION INTRAVENOUS PRN
Status: DISCONTINUED | OUTPATIENT
Start: 2023-06-05 | End: 2023-06-05 | Stop reason: SDUPTHER

## 2023-06-05 RX ORDER — ONDANSETRON 2 MG/ML
4 INJECTION INTRAMUSCULAR; INTRAVENOUS EVERY 6 HOURS PRN
Status: DISCONTINUED | OUTPATIENT
Start: 2023-06-05 | End: 2023-06-07 | Stop reason: HOSPADM

## 2023-06-05 RX ORDER — DIPHENHYDRAMINE HYDROCHLORIDE 50 MG/ML
12.5 INJECTION INTRAMUSCULAR; INTRAVENOUS
Status: DISCONTINUED | OUTPATIENT
Start: 2023-06-05 | End: 2023-06-05

## 2023-06-05 RX ORDER — SODIUM CHLORIDE 0.9 % (FLUSH) 0.9 %
5-40 SYRINGE (ML) INJECTION PRN
Status: DISCONTINUED | OUTPATIENT
Start: 2023-06-05 | End: 2023-06-07 | Stop reason: HOSPADM

## 2023-06-05 RX ORDER — CARBOPROST TROMETHAMINE 250 UG/ML
250 INJECTION, SOLUTION INTRAMUSCULAR PRN
Status: DISCONTINUED | OUTPATIENT
Start: 2023-06-05 | End: 2023-06-07 | Stop reason: HOSPADM

## 2023-06-05 RX ORDER — IBUPROFEN 600 MG/1
600 TABLET ORAL EVERY 8 HOURS SCHEDULED
Status: DISCONTINUED | OUTPATIENT
Start: 2023-06-05 | End: 2023-06-07 | Stop reason: HOSPADM

## 2023-06-05 RX ORDER — KETOROLAC TROMETHAMINE 30 MG/ML
30 INJECTION, SOLUTION INTRAMUSCULAR; INTRAVENOUS EVERY 6 HOURS PRN
Status: DISCONTINUED | OUTPATIENT
Start: 2023-06-05 | End: 2023-06-07 | Stop reason: HOSPADM

## 2023-06-05 RX ORDER — HYDROCODONE BITARTRATE AND ACETAMINOPHEN 10; 325 MG/1; MG/1
1 TABLET ORAL EVERY 6 HOURS PRN
Status: DISCONTINUED | OUTPATIENT
Start: 2023-06-05 | End: 2023-06-07 | Stop reason: HOSPADM

## 2023-06-05 RX ORDER — MORPHINE SULFATE 1 MG/ML
INJECTION, SOLUTION EPIDURAL; INTRATHECAL; INTRAVENOUS PRN
Status: DISCONTINUED | OUTPATIENT
Start: 2023-06-05 | End: 2023-06-05 | Stop reason: SDUPTHER

## 2023-06-05 RX ORDER — METHYLERGONOVINE MALEATE 0.2 MG/ML
200 INJECTION INTRAVENOUS PRN
Status: DISCONTINUED | OUTPATIENT
Start: 2023-06-05 | End: 2023-06-07 | Stop reason: HOSPADM

## 2023-06-05 RX ORDER — ACETAMINOPHEN 500 MG
1000 TABLET ORAL EVERY 8 HOURS SCHEDULED
Status: DISCONTINUED | OUTPATIENT
Start: 2023-06-05 | End: 2023-06-07 | Stop reason: HOSPADM

## 2023-06-05 RX ORDER — MISOPROSTOL 200 UG/1
800 TABLET ORAL PRN
Status: DISCONTINUED | OUTPATIENT
Start: 2023-06-05 | End: 2023-06-07 | Stop reason: HOSPADM

## 2023-06-05 RX ORDER — DEXAMETHASONE SODIUM PHOSPHATE 4 MG/ML
INJECTION, SOLUTION INTRA-ARTICULAR; INTRALESIONAL; INTRAMUSCULAR; INTRAVENOUS; SOFT TISSUE PRN
Status: DISCONTINUED | OUTPATIENT
Start: 2023-06-05 | End: 2023-06-05 | Stop reason: SDUPTHER

## 2023-06-05 RX ORDER — BUPIVACAINE HYDROCHLORIDE 7.5 MG/ML
INJECTION, SOLUTION EPIDURAL; RETROBULBAR PRN
Status: DISCONTINUED | OUTPATIENT
Start: 2023-06-05 | End: 2023-06-05 | Stop reason: SDUPTHER

## 2023-06-05 RX ORDER — SODIUM CHLORIDE 0.9 % (FLUSH) 0.9 %
5-40 SYRINGE (ML) INJECTION EVERY 12 HOURS SCHEDULED
Status: DISCONTINUED | OUTPATIENT
Start: 2023-06-05 | End: 2023-06-07 | Stop reason: HOSPADM

## 2023-06-05 RX ORDER — LANOLIN/MINERAL OIL
LOTION (ML) TOPICAL
Status: DISCONTINUED | OUTPATIENT
Start: 2023-06-05 | End: 2023-06-07 | Stop reason: HOSPADM

## 2023-06-05 RX ORDER — HYDROCODONE BITARTRATE AND ACETAMINOPHEN 5; 325 MG/1; MG/1
1 TABLET ORAL EVERY 6 HOURS PRN
Status: DISCONTINUED | OUTPATIENT
Start: 2023-06-05 | End: 2023-06-07 | Stop reason: HOSPADM

## 2023-06-05 RX ORDER — SODIUM CHLORIDE 9 MG/ML
INJECTION, SOLUTION INTRAVENOUS PRN
Status: DISCONTINUED | OUTPATIENT
Start: 2023-06-05 | End: 2023-06-07 | Stop reason: HOSPADM

## 2023-06-05 RX ORDER — ONDANSETRON 2 MG/ML
4 INJECTION INTRAMUSCULAR; INTRAVENOUS EVERY 6 HOURS PRN
Status: DISCONTINUED | OUTPATIENT
Start: 2023-06-05 | End: 2023-06-05

## 2023-06-05 RX ORDER — ONDANSETRON 2 MG/ML
INJECTION INTRAMUSCULAR; INTRAVENOUS PRN
Status: DISCONTINUED | OUTPATIENT
Start: 2023-06-05 | End: 2023-06-05 | Stop reason: SDUPTHER

## 2023-06-05 RX ORDER — SWAB
1 SWAB, NON-MEDICATED MISCELLANEOUS DAILY
Status: DISCONTINUED | OUTPATIENT
Start: 2023-06-05 | End: 2023-06-07 | Stop reason: HOSPADM

## 2023-06-05 RX ORDER — TRANEXAMIC ACID 100 MG/ML
INJECTION, SOLUTION INTRAVENOUS PRN
Status: DISCONTINUED | OUTPATIENT
Start: 2023-06-05 | End: 2023-06-05 | Stop reason: SDUPTHER

## 2023-06-05 RX ORDER — SENNA AND DOCUSATE SODIUM 50; 8.6 MG/1; MG/1
1 TABLET, FILM COATED ORAL DAILY
Status: DISCONTINUED | OUTPATIENT
Start: 2023-06-05 | End: 2023-06-07 | Stop reason: HOSPADM

## 2023-06-05 RX ADMIN — KETOROLAC TROMETHAMINE 30 MG: 30 INJECTION, SOLUTION INTRAMUSCULAR; INTRAVENOUS at 21:11

## 2023-06-05 RX ADMIN — EPHEDRINE SULFATE 10 MG: 50 INJECTION, SOLUTION INTRAVENOUS at 08:34

## 2023-06-05 RX ADMIN — SODIUM CHLORIDE, POTASSIUM CHLORIDE, SODIUM LACTATE AND CALCIUM CHLORIDE: 600; 310; 30; 20 INJECTION, SOLUTION INTRAVENOUS at 21:10

## 2023-06-05 RX ADMIN — ONDANSETRON HYDROCHLORIDE 4 MG: 2 SOLUTION INTRAMUSCULAR; INTRAVENOUS at 07:54

## 2023-06-05 RX ADMIN — BUPIVACAINE HYDROCHLORIDE 1.4 DROP: 7.5 INJECTION, SOLUTION EPIDURAL; RETROBULBAR at 07:50

## 2023-06-05 RX ADMIN — ONDANSETRON 4 MG: 2 INJECTION INTRAMUSCULAR; INTRAVENOUS at 13:59

## 2023-06-05 RX ADMIN — DEXAMETHASONE SODIUM PHOSPHATE 4 MG: 4 INJECTION, SOLUTION INTRAMUSCULAR; INTRAVENOUS at 08:39

## 2023-06-05 RX ADMIN — KETOROLAC TROMETHAMINE 30 MG: 30 INJECTION, SOLUTION INTRAMUSCULAR; INTRAVENOUS at 14:01

## 2023-06-05 RX ADMIN — SODIUM CHLORIDE, POTASSIUM CHLORIDE, SODIUM LACTATE AND CALCIUM CHLORIDE: 600; 310; 30; 20 INJECTION, SOLUTION INTRAVENOUS at 07:21

## 2023-06-05 RX ADMIN — TRANEXAMIC ACID 1000 MG: 100 INJECTION, SOLUTION INTRAVENOUS at 08:29

## 2023-06-05 RX ADMIN — SODIUM CHLORIDE, POTASSIUM CHLORIDE, SODIUM LACTATE AND CALCIUM CHLORIDE 1000 ML: 600; 310; 30; 20 INJECTION, SOLUTION INTRAVENOUS at 06:24

## 2023-06-05 RX ADMIN — SODIUM CHLORIDE, POTASSIUM CHLORIDE, SODIUM LACTATE AND CALCIUM CHLORIDE 1000 ML: 600; 310; 30; 20 INJECTION, SOLUTION INTRAVENOUS at 09:30

## 2023-06-05 RX ADMIN — CEFAZOLIN SODIUM 2000 MG: 1 POWDER, FOR SOLUTION INTRAMUSCULAR; INTRAVENOUS at 07:59

## 2023-06-05 RX ADMIN — KETOROLAC TROMETHAMINE 30 MG: 30 INJECTION, SOLUTION INTRAMUSCULAR; INTRAVENOUS at 08:47

## 2023-06-05 RX ADMIN — MORPHINE SULFATE 0.2 MG: 1 INJECTION, SOLUTION EPIDURAL; INTRATHECAL; INTRAVENOUS at 07:50

## 2023-06-05 RX ADMIN — SODIUM CHLORIDE, POTASSIUM CHLORIDE, SODIUM LACTATE AND CALCIUM CHLORIDE 1000 ML: 600; 310; 30; 20 INJECTION, SOLUTION INTRAVENOUS at 06:25

## 2023-06-05 RX ADMIN — PHENYLEPHRINE HYDROCHLORIDE 50 MCG/MIN: 10 INJECTION INTRAVENOUS at 07:51

## 2023-06-05 RX ADMIN — OXYTOCIN 40 ML: 10 INJECTION, SOLUTION INTRAMUSCULAR; INTRAVENOUS at 08:19

## 2023-06-05 RX ADMIN — FENTANYL CITRATE 10 MCG: 50 INJECTION, SOLUTION INTRAMUSCULAR; INTRAVENOUS at 07:50

## 2023-06-05 ASSESSMENT — PAIN SCALES - GENERAL
PAINLEVEL_OUTOF10: 6
PAINLEVEL_OUTOF10: 5

## 2023-06-05 ASSESSMENT — PAIN DESCRIPTION - LOCATION
LOCATION: INCISION
LOCATION: INCISION

## 2023-06-05 ASSESSMENT — PAIN DESCRIPTION - ORIENTATION: ORIENTATION: LOWER

## 2023-06-05 ASSESSMENT — PAIN DESCRIPTION - DESCRIPTORS
DESCRIPTORS: SHARP
DESCRIPTORS: BURNING

## 2023-06-05 NOTE — CARE COORDINATION
6/5/2023  11:54 AM    CM met with TIGRE to complete initial assessment and begin discharge planning. MOB verified and confirmed demographics. TIGRE lives with FOB, at the address on file. TIGRE reports she has good family support, and feels like she has the support she needs when she returns home. TIGRE plans to breast and bottle feed baby. [facility], will provide follow up care for infant. TIGRE has car seat, bassinet/crib, clothing, bottles and all necessary supplies for baby. TIGRE has Medicaid, and will be adding baby to this policy. CM discussed process to add baby to insurance, TIGRE verbalized understanding. 06/05/23 0460   Service Assessment   Patient Orientation Alert and Oriented   Cognition Alert   History Provided By Patient   Primary Caregiver Self   Support Systems Spouse/Significant Other   PCP Verified by CM Yes   Last Visit to PCP Within last 3 months   Prior Functional Level Independent in ADLs/IADLs   Current Functional Level Independent in ADLs/IADLs   Can patient return to prior living arrangement Yes   Ability to make needs known: Good   Family able to assist with home care needs: Yes   Would you like for me to discuss the discharge plan with any other family members/significant others, and if so, who?  No     Bob ColungaBS

## 2023-06-05 NOTE — H&P
Sig Start Date End Date Taking? Authorizing Provider   Lancets MISC Use as directed. 4x/d 3/31/23   Ar Automatic Reconciliation   ferrous sulfate (IRON 325) 325 (65 Fe) MG tablet Take 1 tablet by mouth every other day 12/19/22   Ar Automatic Reconciliation        Review of Systems: A comprehensive review of systems was negative. Objective:     Vitals:  Vitals:    06/05/23 1046 06/05/23 1052 06/05/23 1118 06/05/23 1408   BP: (!) 101/59 101/65 (!) 105/57 109/68   Pulse: 79 75 74 63   Resp: 16 16 18 16   Temp:  97.6 °F (36.4 °C) 97.8 °F (36.6 °C) 98.7 °F (37.1 °C)   TempSrc:  Axillary Oral Oral   SpO2: 100% 100% 100% 96%   Weight:            Physical Exam:  regular rate and rhythm, no murmurs  Clear To Auscultation bilaterally - no wheezes, rales, rhonchi, or crackles    Membranes:  Intact  Fetal Heart Rate: Reactive    Prenatal Labs:   No components found for: OBEXTABORH, OBEXTABSCRN, OBEXTRUBELLA, OBEXTGRBS, OBEXTHBSAG, OBEXTHIV, OBEXTRPR, OBEXTGONORR, OBEXTCHLAM     Assessment/Plan:     Plan: Admit for  repeat CS . Group B Strep was positive, use of prophylactic antibiotics not indicated.     Signed By:  Nydia Velazquez MD     June 5, 2023

## 2023-06-05 NOTE — ANESTHESIA PROCEDURE NOTES
Spinal Block    Patient location during procedure: OB  End time: 6/5/2023 7:56 AM  Reason for block: primary anesthetic  Staffing  Resident/CRNA: KERRY Myles - CRNA  Spinal Block  Patient position: sitting  Prep: DuraPrep  Patient monitoring: cardiac monitor, continuous pulse ox and frequent blood pressure checks  Approach: midline  Location: L3/L4  Provider prep: mask and sterile gloves  Needle  Needle type: pencil-tip   Needle gauge: 25 G  Needle length: 5 in  Assessment  Sensory level: T4  CSF: clear  Attempts: 1  Hemodynamics: stable  Preanesthetic Checklist  Completed: patient identified, IV checked, site marked, risks and benefits discussed, surgical/procedural consents, equipment checked, pre-op evaluation, timeout performed, anesthesia consent given, oxygen available, monitors applied/VS acknowledged, fire risk safety assessment completed and verbalized and blood product R/B/A discussed and consented

## 2023-06-05 NOTE — PROGRESS NOTES
0600- pt arrives to unit for scheduled  with MD Emilee Feliz. 0720- Pt cleaned and prepped with CHG wipes. 0730- MD Deepak Berman at bedside to consent pt for anesthesia     0740- MD Emilee Feliz at bedside to consent pt for procedure. 0745- Pt in OR for scheduled . Please see  summary for further details. 0900- Pt arrives back to the room after scheduled . Pt is alert and stable. SCD's placed on pt and this RN at bedside. 0930- Verbal order from DEYVI Aaron to hang another bag of fluids. Pt pressure 90' systolic and 26'Z diastolic. CRNA aware. Hadriana.Expose- MD Granda aware of pts QBL after procedure. 1000- Pt given ice and tolerated    1100- Bedside shift change report given to [id number] (oncoming nurse) by Nomi Stover RN (offgoing nurse). Report included the following information Nurse Handoff Report, Adult Overview, Surgery Report, Intake/Output, MAR, and Recent Results.

## 2023-06-05 NOTE — ANESTHESIA PRE PROCEDURE
ITP2QKE, [id number], BEART, [id number]     Type & Screen (If Applicable):  No results found for: LABABO, LABRH    Drug/Infectious Status (If Applicable):  Lab Results   Component Value Date/Time    HEPCAB NONREACTIVE 12/16/2022 02:54 PM       COVID-19 Screening (If Applicable): No results found for: COVID19        Anesthesia Evaluation  Patient summary reviewed and Nursing notes reviewed  Airway: Mallampati: II  TM distance: >3 FB   Neck ROM: full  Mouth opening: > = 3 FB   Dental: normal exam         Pulmonary:Negative Pulmonary ROS breath sounds clear to auscultation                             Cardiovascular:Negative CV ROS            Rhythm: regular  Rate: normal                    Neuro/Psych:               GI/Hepatic/Renal:             Endo/Other: Negative Endo/Other ROS                    Abdominal:             Vascular: Other Findings:           Anesthesia Plan      spinal     ASA 2             Anesthetic plan and risks discussed with patient. Plan discussed with CRNA.           Post-op pain plan if not by surgeon: intrathecal narcotics            Tyesha Tello MD   6/5/2023

## 2023-06-05 NOTE — OP NOTE
wiped clean with a moist lap pad and cleared of all clots and debris. The uterine incision was closed in 2 layers, first with a running locked suture of 0-Monocryl, then with an imbricated layer. Adequate hemostasis was noted. Both tubes and ovaries appeared normal. The pericolic gutters were then lavaged clean with normal saline. Surgicell was placed. Good hemostasis was again reassured The Bob retractor was removed. Surgicell was placed over the muscles inferiorly. The fascia was closed with stratifix symmetric in a running fashion. Good hemostasis was assured. The incision was lavaged clean and small bleeders were coagulated with the bovie. The skin was closed with absorbable staples. The patient tolerated the procedure well. Sponge, lap, and needle counts were correct times three and the patient and baby were taken to recovery/postpartum room in stable condition.     Damion Soto MD  June 5, 2023  6:09 PM

## 2023-06-05 NOTE — ANESTHESIA POSTPROCEDURE EVALUATION
Department of Anesthesiology  Postprocedure Note    Patient: Donnell Núñez  MRN: [id number]  YOB: 2000  Date of evaluation: 2023      Procedure Summary     Date: 23 Room / Location: [facility] L&D  / [facility] L&D OR    Anesthesia Start: 0744 Anesthesia Stop: 4262    Procedure:  SECTION (Abdomen) Diagnosis:       Previous  delivery, delivered      (Repeat)    Surgeons: Elias Trent MD Responsible Provider: Carmen Ji MD    Anesthesia Type: spinal ASA Status: 2          Anesthesia Type: No value filed.     Whitney Phase I:      Whitney Phase II:        Anesthesia Post Evaluation    Patient location during evaluation: bedside  Patient participation: complete - patient participated  Level of consciousness: awake  Airway patency: patent  Nausea & Vomiting: no vomiting and no nausea  Complications: no  Cardiovascular status: hemodynamically stable  Respiratory status: acceptable  Hydration status: stable

## 2023-06-06 LAB
ERYTHROCYTE [DISTWIDTH] IN BLOOD BY AUTOMATED COUNT: 13.8 % (ref 11.5–14.5)
HCT VFR BLD AUTO: 27.6 % (ref 35–47)
HGB BLD-MCNC: 9.4 G/DL (ref 11.5–16)
MCH RBC QN AUTO: 28.5 PG (ref 26–34)
MCHC RBC AUTO-ENTMCNC: 34.1 G/DL (ref 30–36.5)
MCV RBC AUTO: 83.6 FL (ref 80–99)
NRBC # BLD: 0 K/UL (ref 0–0.01)
NRBC BLD-RTO: 0 PER 100 WBC
PLATELET # BLD AUTO: 154 K/UL (ref 150–400)
PMV BLD AUTO: 10.5 FL (ref 8.9–12.9)
RBC # BLD AUTO: 3.3 M/UL (ref 3.8–5.2)
WBC # BLD AUTO: 7.4 K/UL (ref 3.6–11)

## 2023-06-06 PROCEDURE — 1100000000 HC RM PRIVATE

## 2023-06-06 PROCEDURE — 85027 COMPLETE CBC AUTOMATED: CPT

## 2023-06-06 PROCEDURE — 36415 COLL VENOUS BLD VENIPUNCTURE: CPT

## 2023-06-06 PROCEDURE — 6370000000 HC RX 637 (ALT 250 FOR IP): Performed by: OBSTETRICS & GYNECOLOGY

## 2023-06-06 PROCEDURE — 6360000002 HC RX W HCPCS: Performed by: OBSTETRICS & GYNECOLOGY

## 2023-06-06 RX ORDER — FERROUS SULFATE 325(65) MG
325 TABLET ORAL 2 TIMES DAILY WITH MEALS
Status: DISCONTINUED | OUTPATIENT
Start: 2023-06-06 | End: 2023-06-07 | Stop reason: HOSPADM

## 2023-06-06 RX ORDER — OXYCODONE HYDROCHLORIDE 5 MG/1
5 TABLET ORAL EVERY 4 HOURS PRN
Status: DISCONTINUED | OUTPATIENT
Start: 2023-06-06 | End: 2023-06-07 | Stop reason: HOSPADM

## 2023-06-06 RX ORDER — OXYCODONE HYDROCHLORIDE 5 MG/1
10 TABLET ORAL EVERY 4 HOURS PRN
Status: DISCONTINUED | OUTPATIENT
Start: 2023-06-06 | End: 2023-06-07 | Stop reason: HOSPADM

## 2023-06-06 RX ADMIN — KETOROLAC TROMETHAMINE 30 MG: 30 INJECTION, SOLUTION INTRAMUSCULAR; INTRAVENOUS at 04:50

## 2023-06-06 RX ADMIN — SIMETHICONE 80 MG: 80 TABLET, CHEWABLE ORAL at 05:06

## 2023-06-06 RX ADMIN — FERROUS SULFATE TAB 325 MG (65 MG ELEMENTAL FE) 325 MG: 325 (65 FE) TAB at 16:05

## 2023-06-06 RX ADMIN — IBUPROFEN 600 MG: 600 TABLET, FILM COATED ORAL at 11:41

## 2023-06-06 RX ADMIN — IBUPROFEN 600 MG: 600 TABLET, FILM COATED ORAL at 21:43

## 2023-06-06 RX ADMIN — Medication 1 TABLET: at 09:23

## 2023-06-06 RX ADMIN — DOCUSATE SODIUM 50MG AND SENNOSIDES 8.6MG 1 TABLET: 8.6; 5 TABLET, FILM COATED ORAL at 09:23

## 2023-06-06 RX ADMIN — SIMETHICONE 80 MG: 80 TABLET, CHEWABLE ORAL at 22:31

## 2023-06-06 RX ADMIN — OXYCODONE HYDROCHLORIDE 10 MG: 5 TABLET ORAL at 16:05

## 2023-06-06 RX ADMIN — FERROUS SULFATE TAB 325 MG (65 MG ELEMENTAL FE) 325 MG: 325 (65 FE) TAB at 09:23

## 2023-06-06 RX ADMIN — ACETAMINOPHEN 1000 MG: 500 TABLET ORAL at 21:43

## 2023-06-06 ASSESSMENT — PAIN DESCRIPTION - ORIENTATION
ORIENTATION: LOWER

## 2023-06-06 ASSESSMENT — PAIN DESCRIPTION - LOCATION
LOCATION: INCISION
LOCATION: ABDOMEN;INCISION
LOCATION: ABDOMEN;INCISION

## 2023-06-06 ASSESSMENT — PAIN SCALES - GENERAL
PAINLEVEL_OUTOF10: 7
PAINLEVEL_OUTOF10: 5
PAINLEVEL_OUTOF10: 8

## 2023-06-06 ASSESSMENT — PAIN DESCRIPTION - DESCRIPTORS
DESCRIPTORS: SORE
DESCRIPTORS: ACHING;BURNING
DESCRIPTORS: ACHING;BURNING;SORE

## 2023-06-06 NOTE — PROGRESS NOTES
Post-Operative Day Number 1 Progress Note    Patient doing well post-op day 1 from  delivery without significant complaints. Pain controlled on current medication. Voiding without difficulty, normal lochia. Vitals:  Patient Vitals for the past 8 hrs:   BP Temp Temp src Pulse Resp SpO2   23 0556 103/65 98.4 °F (36.9 °C) Oral 66 16 96 %   23 0140 105/69 98.5 °F (36.9 °C) Oral 78 16 97 %     Temp (24hrs), Av.9 °F (36.6 °C), Min:97.4 °F (36.3 °C), Max:98.7 °F (37.1 °C)      Vital signs stable, afebrile. Exam:  Patient without distress. Abdomen soft, fundus firm at level of umbilicus, nontender. Incision dry and clean without erythema. Lower extremities are negative for swelling, cords or tenderness. Labs:   Recent Results (from the past 24 hour(s))   CBC    Collection Time: 23  1:31 AM   Result Value Ref Range    WBC 7.4 3.6 - 11.0 K/uL    RBC 3.30 (L) 3.80 - 5.20 M/uL    Hemoglobin 9.4 (L) 11.5 - 16.0 g/dL    Hematocrit 27.6 (L) 35.0 - 47.0 %    MCV 83.6 80.0 - 99.0 FL    MCH 28.5 26.0 - 34.0 PG    MCHC 34.1 30.0 - 36.5 g/dL    RDW 13.8 11.5 - 14.5 %    Platelets 382 894 - 505 K/uL    MPV 10.5 8.9 - 12.9 FL    Nucleated RBCs 0.0 0  WBC    nRBC 0.00 0.00 - 0.01 K/uL       Assessment and Plan:  Patient appears to be having uncomplicated post- course. Continue routine post-op care and maternal education. Anemia - was 9.6 2 months ago. Suspect her preop Hgb is not accurate - would be very unlikely to improve like that in pregnancy in such a short period of time.  Chronic anemia

## 2023-06-06 NOTE — LACTATION NOTE
This note was copied from a baby's chart. Mother states nursing is going well, without discomfort, and she is offering the breast to early cues of hunger. Normal  behaviors and output expectations reviewed. Mother able to latch infant independently in prone position. Mother encourage to achieve deeper latch by unwrapping infant and placing skin to skin. Hand pump to bedside with instruction as well as breastfeeding information in Upper sorbian language. Discussed with mother her plan for feeding. Reviewed the benefits of exclusive breast milk feeding during the hospital stay. Informed her of the risks of using formula to supplement in the first few days of life as well as the benefits of successful breast milk feeding; referred her to the Breastfeeding booklet about this information. She acknowledges understanding of information reviewed and states that it is her plan to blendfeed her infant. Will support her choice and offer additional information as needed. Pt chooses to do both breast and bottle. Discussed effects of early supplementation on breastfeeding success; may decrease breastmilk production and supply, increase risk for pathological engorgement, baby may develop preference for faster flow from bottles vs breast, and baby's stomach can be stretched if larger volumes of formula are given. Hand Expression Education:  Mom taught how to manually hand express her colostrum. Emphasized the importance of providing infant with valuable colostrum as infant rests skin to skin at breast.  Aware to avoid extended periods of non-feeding. Aware to offer 10-20+ drops of colostrum every 2-3 hours until infant is latching and nursing effectively. Taught the rationale behind this low tech but highly effective evidence based practice.      Pt will successfully establish breastfeeding by feeding in response to early feeding cues   or wake every 3h, will obtain deep latch, and will keep log of
This note was copied from a baby's chart. Mother was able to breastfeed her baby during [facility] visit. Baby latched on and breast fed well. Mother has been breast/formula feeding her baby. Reviewed breastfeeding basics:  Supply and demand,  stomach size, early  Feeding cues, skin to skin, positioning and baby led latch-on, assymetrical latch with signs of good, deep latch vs shallow, feeding frequency and duration, and log sheet for tracking infant feedings and output. Breastfeeding Booklet and Warm line information given. Discussed typical  weight loss and the importance of infant weight checks with pediatrician 1-2 post discharge. Mother will successfully establish breastfeeding by feeding in response to early feeding cues   or wake every 3h, will obtain deep latch, and will keep log of feedings/output. Taught to BF at hunger cues and or q 2-3 hrs and to offer 10-20 drops of hand expressed colostrum at any non-feeds. Left Breast: Soft, Non Tend  Left Nipple: Protrude  Right Nipple: Protrude  Right Breast: Soft, Non Tend  Position and Latch: Independently, Good technique, Provides breast support  Signs of Transfer: Uterine cramping, Audible infant swallows, Nutritive sucking  Maternal Response: Relaxed and confident, Attentive, Comfortable,  Comfortable with position          Latch: Grasps breast, tongue down, lips flanged, rhythmic sucking  Audible Swallowing: A few with stimulation  Type of Nipple: Everted (after stimulation)  Comfort (Breast/Nipple): Soft/non-tender  Hold (Positioning): No assist from staff, mother able to position/hold infant  LATCH Score: 9  Breast Care: Bra on, Lanolin provided, Nursing pads     Lactation Comment: Mother able to latched baby onto left breast in cradle hold - baby had good sucking bursts and audible swallows heard. Instructed mother to offer baby breast milk before formula.
at hunger cues and or q 2-3 hrs and to offer 10-20 drops of hand expressed colostrum at any non-feeds. Left Breast: Soft  Left Nipple: Protrude  Right Nipple: Protrude  Right Breast: Soft  Position and Latch:  Independently  Signs of Transfer: Mom reports sleepy feeling  Maternal Response: Relaxed and confident

## 2023-06-07 VITALS
WEIGHT: 174 LBS | RESPIRATION RATE: 18 BRPM | TEMPERATURE: 97.8 F | DIASTOLIC BLOOD PRESSURE: 66 MMHG | HEART RATE: 76 BPM | OXYGEN SATURATION: 98 % | SYSTOLIC BLOOD PRESSURE: 114 MMHG | BODY MASS INDEX: 30.82 KG/M2

## 2023-06-07 PROBLEM — O44.02 COMPLETE PLACENTA PREVIA NOS OR WITHOUT HEMORRHAGE, SECOND TRIMESTER: Status: RESOLVED | Noted: 2023-02-16 | Resolved: 2023-06-07

## 2023-06-07 PROCEDURE — 6370000000 HC RX 637 (ALT 250 FOR IP): Performed by: OBSTETRICS & GYNECOLOGY

## 2023-06-07 RX ORDER — IBUPROFEN 800 MG/1
800 TABLET ORAL EVERY 8 HOURS PRN
Qty: 30 TABLET | Refills: 1 | Status: SHIPPED | OUTPATIENT
Start: 2023-06-07

## 2023-06-07 RX ORDER — OXYCODONE HYDROCHLORIDE 5 MG/1
5 TABLET ORAL EVERY 4 HOURS PRN
Qty: 20 TABLET | Refills: 0 | Status: SHIPPED | OUTPATIENT
Start: 2023-06-07 | End: 2023-06-10

## 2023-06-07 RX ADMIN — IBUPROFEN 600 MG: 600 TABLET, FILM COATED ORAL at 05:38

## 2023-06-07 RX ADMIN — Medication 1 TABLET: at 09:20

## 2023-06-07 RX ADMIN — DOCUSATE SODIUM 50MG AND SENNOSIDES 8.6MG 1 TABLET: 8.6; 5 TABLET, FILM COATED ORAL at 09:20

## 2023-06-07 RX ADMIN — ACETAMINOPHEN 1000 MG: 500 TABLET ORAL at 05:37

## 2023-06-07 RX ADMIN — FERROUS SULFATE TAB 325 MG (65 MG ELEMENTAL FE) 325 MG: 325 (65 FE) TAB at 09:20

## 2023-06-07 ASSESSMENT — PAIN DESCRIPTION - DESCRIPTORS: DESCRIPTORS: ACHING

## 2023-06-07 ASSESSMENT — PAIN SCALES - GENERAL: PAINLEVEL_OUTOF10: 6

## 2023-06-07 ASSESSMENT — PAIN DESCRIPTION - LOCATION: LOCATION: INCISION

## 2023-06-07 ASSESSMENT — PAIN DESCRIPTION - ORIENTATION: ORIENTATION: LOWER

## 2023-06-07 NOTE — PROGRESS NOTES
Pt discharged home. Discharge instructions and education completed and patient reported she had no more questions. Bands verified on patients and infant, see footprint sheet. Infant placed in car seat by parent.      Prescriptions esent by MD

## 2023-06-07 NOTE — DISCHARGE SUMMARY
Obstetrical Discharge Summary     Name: Gege Sanz MRN: [id number]  SSN: [id number]    YOB: 2000  Age: 23 y.o. Sex: female      Admit Date: 2023    Discharge Date: 2023     Admitting Physician: Raissa Simpson MD     Attending Physician:  Raissa Simpson MD     Admission Diagnoses: Previous  delivery, delivered [O34.219]  Post term pregnancy over 40 weeks [O48.0]    Discharge Diagnoses:   Information for the patient's :  Terri Guzman [[id number]]   @[phone]@      Additional Diagnoses:  No components found for: [facility] Course: Normal hospital course following the delivery. Disposition: Home  Condition: Good    Patient Instructions:   Current Discharge Medication List        START taking these medications    Details   ibuprofen (ADVIL;MOTRIN) 800 MG tablet Take 1 tablet by mouth every 8 hours as needed for Pain  Qty: 30 tablet, Refills: 1      oxyCODONE (ROXICODONE) 5 MG immediate release tablet Take 1 tablet by mouth every 4 hours as needed for Pain for up to 3 days. Max Daily Amount: 30 mg  Qty: 20 tablet, Refills: 0    Comments: Reduce doses taken as pain becomes manageable  Associated Diagnoses: Post term pregnancy over 40 weeks           CONTINUE these medications which have NOT CHANGED    Details   Lancets MISC Use as directed. 4x/d      ferrous sulfate (IRON 325) 325 (65 Fe) MG tablet Take 1 tablet by mouth every other day             Reference my discharge instructions. No follow-ups on file.      Signed By:  Raissa Simpson MD     2023

## 2023-07-18 NOTE — PROGRESS NOTES
Hayder Peterson is a 23 y.o. female returns for a routine post-partum follow-up visit     Chief Complaint   Patient presents with    Postpartum Care       Postpartum Depression: Low Risk     Last EPDS Total Score: 1    Last EPDS Self Harm Result: Never         Type of delivery: repeat  section  Date of Delivery: 2023  Breastfeeding: yes  Bleeding Resolved: no  Birth Control: none. Last Pap: 2022; NILM; HPV criteria was not met    Problems: no problems  Would like to take birth control pills  Examination chaperoned by Addis Damico LPN.

## 2023-07-19 ENCOUNTER — POSTPARTUM VISIT (OUTPATIENT)
Age: 23
End: 2023-07-19

## 2023-07-19 VITALS — SYSTOLIC BLOOD PRESSURE: 108 MMHG | WEIGHT: 147 LBS | DIASTOLIC BLOOD PRESSURE: 71 MMHG | BODY MASS INDEX: 26.04 KG/M2

## 2023-07-19 PROCEDURE — 0503F POSTPARTUM CARE VISIT: CPT | Performed by: OBSTETRICS & GYNECOLOGY

## 2023-07-25 ENCOUNTER — PROCEDURE VISIT (OUTPATIENT)
Age: 23
End: 2023-07-25
Payer: MEDICAID

## 2023-07-25 VITALS — SYSTOLIC BLOOD PRESSURE: 102 MMHG | BODY MASS INDEX: 26.04 KG/M2 | WEIGHT: 147 LBS | DIASTOLIC BLOOD PRESSURE: 84 MMHG

## 2023-07-25 DIAGNOSIS — Z87.42 HISTORY OF MENORRHAGIA: ICD-10-CM

## 2023-07-25 DIAGNOSIS — Z30.017 NEXPLANON INSERTION: Primary | ICD-10-CM

## 2023-07-25 LAB
HCG, PREGNANCY, URINE, POC: NEGATIVE
VALID INTERNAL CONTROL, POC: YES

## 2023-07-25 PROCEDURE — 11981 INSERTION DRUG DLVR IMPLANT: CPT | Performed by: OBSTETRICS & GYNECOLOGY

## 2023-07-25 PROCEDURE — 81025 URINE PREGNANCY TEST: CPT | Performed by: OBSTETRICS & GYNECOLOGY

## 2023-07-25 NOTE — PROGRESS NOTES
Ita Gonzales is a 23 y.o. female presents for a problem visit. Chief Complaint   Patient presents with    Procedure     Nexplanon Insertion       No LMP recorded.       Device number [id number], expiration date 5/19/2025    Chart reviewed for the following:   ILaura MA, have reviewed the History, Physical and updated the Allergic reactions for [facility] performed immediately prior to start of procedure:   Autumn Mcneil MA, have performed the following reviews on Ita Gonzales prior to the start of the procedure:            * Patient was identified by name and date of birth   * Agreement on procedure being performed was verified  * Risks and Benefits explained to the patient  * Procedure site verified and marked as necessary  * Patient was positioned for comfort  * Consent was signed and verified     Time: 9:30    Date of procedure: 7/25/2023    Procedure performed by:  Jayson Reina MD       How tolerated by patient: tolerated the procedure well with no complications    Post Procedural Pain Scale: 2 - Hurts Little Bit    Comments: none    Examination chaperoned by Laura Olson MA.
Procedure note: Nexplanon insertion    Melody Deviln is a [id number],  23 y.o. female  / Birder Gobble whose No LMP recorded. was on  presents for office insertion of an [address] sub-dermal contraceptive implant. She has had an opportunity to read the [address] \"Patient Labeling and Consent Form\". We counseled Samantha Staples about the insertion and removal procedures as well as potential side-effects, benefits and risks. She state she had no further questions and signed the consent form. She has been using none to the present time. She confirmed that she has no allergies to Betadine or Xylocaine. She reclined on the examination table in the supine position with her left arm flexed at the elbow and externally rotated. The insertion site was identified and marked approximately 6-8 cm proximal to the elbow crease at the inner side of the upper arm over the triceps muscle . A second felice was placed 6-8 cm above the first felice. The insertion site was cleansed with Betadine antiseptic. Approximately 5 ml of 2% xylocaine without epinephrine were injected just under the skin along the planned insertion canal. The NEXPLANON sterile applicator was carefully removed from its blister pack and kept sterile. I removed the needle cap. I visually verified the presence of NEXPLANON inside the needle tip. The skin at the insertion site was then stretched by my thumb and index finger. I then inserted the needle tip through the skin at the appropriate angle to the skin surface, just until the skin has been penetrated. The needle was gently inserted to its full length. The slider was then pushed all the way and then released. I then removed the needle and palpated the implant in the appropriate location. The patient also palpated the implant in place. Both Amber and BALBINA were able to confirm the presence of the [address] in its subdermal location by palpation. The skin was cleansed and dried.  A small adhesive bandage
yes

## 2024-10-17 ENCOUNTER — HOSPITAL ENCOUNTER (EMERGENCY)
Facility: HOSPITAL | Age: 24
Discharge: HOME | End: 2024-10-17
Attending: STUDENT IN AN ORGANIZED HEALTH CARE EDUCATION/TRAINING PROGRAM
Payer: MEDICAID

## 2024-10-17 VITALS
SYSTOLIC BLOOD PRESSURE: 136 MMHG | RESPIRATION RATE: 18 BRPM | BODY MASS INDEX: 26.14 KG/M2 | WEIGHT: 138.45 LBS | HEART RATE: 62 BPM | TEMPERATURE: 98.3 F | HEIGHT: 61 IN | OXYGEN SATURATION: 99 % | DIASTOLIC BLOOD PRESSURE: 88 MMHG

## 2024-10-17 DIAGNOSIS — N93.9 VAGINAL BLEEDING: Primary | ICD-10-CM

## 2024-10-17 LAB
ANION GAP SERPL CALC-SCNC: 5 MMOL/L (ref 2–12)
BASOPHILS # BLD: 0 K/UL (ref 0–0.1)
BASOPHILS NFR BLD: 1 % (ref 0–1)
BUN SERPL-MCNC: 13 MG/DL (ref 6–20)
BUN/CREAT SERPL: 24 (ref 12–20)
CALCIUM SERPL-MCNC: 8.9 MG/DL (ref 8.5–10.1)
CHLORIDE SERPL-SCNC: 107 MMOL/L (ref 97–108)
CO2 SERPL-SCNC: 25 MMOL/L (ref 21–32)
COMMENT:: NORMAL
CREAT SERPL-MCNC: 0.54 MG/DL (ref 0.55–1.02)
DIFFERENTIAL METHOD BLD: ABNORMAL
EOSINOPHIL # BLD: 0.1 K/UL (ref 0–0.4)
EOSINOPHIL NFR BLD: 2 % (ref 0–7)
ERYTHROCYTE [DISTWIDTH] IN BLOOD BY AUTOMATED COUNT: 13.9 % (ref 11.5–14.5)
GLUCOSE SERPL-MCNC: 92 MG/DL (ref 65–100)
HCG UR QL: NEGATIVE
HCT VFR BLD AUTO: 37.4 % (ref 35–47)
HGB BLD-MCNC: 11.9 G/DL (ref 11.5–16)
IMM GRANULOCYTES # BLD AUTO: 0 K/UL (ref 0–0.04)
IMM GRANULOCYTES NFR BLD AUTO: 0 % (ref 0–0.5)
LYMPHOCYTES # BLD: 1.7 K/UL (ref 0.8–3.5)
LYMPHOCYTES NFR BLD: 23 % (ref 12–49)
MCH RBC QN AUTO: 24.2 PG (ref 26–34)
MCHC RBC AUTO-ENTMCNC: 31.8 G/DL (ref 30–36.5)
MCV RBC AUTO: 76 FL (ref 80–99)
MONOCYTES # BLD: 0.4 K/UL (ref 0–1)
MONOCYTES NFR BLD: 5 % (ref 5–13)
NEUTS SEG # BLD: 5.1 K/UL (ref 1.8–8)
NEUTS SEG NFR BLD: 69 % (ref 32–75)
NRBC # BLD: 0 K/UL (ref 0–0.01)
NRBC BLD-RTO: 0 PER 100 WBC
PLATELET # BLD AUTO: 298 K/UL (ref 150–400)
PMV BLD AUTO: 10.4 FL (ref 8.9–12.9)
POTASSIUM SERPL-SCNC: 3.6 MMOL/L (ref 3.5–5.1)
RBC # BLD AUTO: 4.92 M/UL (ref 3.8–5.2)
SODIUM SERPL-SCNC: 137 MMOL/L (ref 136–145)
SPECIMEN HOLD: NORMAL
WBC # BLD AUTO: 7.3 K/UL (ref 3.6–11)

## 2024-10-17 PROCEDURE — 80048 BASIC METABOLIC PNL TOTAL CA: CPT

## 2024-10-17 PROCEDURE — 36415 COLL VENOUS BLD VENIPUNCTURE: CPT

## 2024-10-17 PROCEDURE — 81025 URINE PREGNANCY TEST: CPT

## 2024-10-17 PROCEDURE — 85025 COMPLETE CBC W/AUTO DIFF WBC: CPT

## 2024-10-17 PROCEDURE — 99284 EMERGENCY DEPT VISIT MOD MDM: CPT

## 2024-10-17 PROCEDURE — 96374 THER/PROPH/DIAG INJ IV PUSH: CPT

## 2024-10-17 PROCEDURE — 6360000002 HC RX W HCPCS: Performed by: STUDENT IN AN ORGANIZED HEALTH CARE EDUCATION/TRAINING PROGRAM

## 2024-10-17 RX ORDER — NAPROXEN 500 MG/1
500 TABLET ORAL 2 TIMES DAILY WITH MEALS
Qty: 14 TABLET | Refills: 0 | Status: SHIPPED | OUTPATIENT
Start: 2024-10-17 | End: 2024-10-24

## 2024-10-17 RX ORDER — KETOROLAC TROMETHAMINE 15 MG/ML
15 INJECTION, SOLUTION INTRAMUSCULAR; INTRAVENOUS
Status: COMPLETED | OUTPATIENT
Start: 2024-10-17 | End: 2024-10-17

## 2024-10-17 RX ADMIN — KETOROLAC TROMETHAMINE 15 MG: 15 INJECTION, SOLUTION INTRAMUSCULAR; INTRAVENOUS at 16:00

## 2024-10-17 ASSESSMENT — PAIN DESCRIPTION - DESCRIPTORS
DESCRIPTORS: ACHING
DESCRIPTORS: ACHING

## 2024-10-17 ASSESSMENT — PAIN SCALES - GENERAL
PAINLEVEL_OUTOF10: 8
PAINLEVEL_OUTOF10: 8

## 2024-10-17 ASSESSMENT — PAIN DESCRIPTION - ORIENTATION: ORIENTATION: LOWER

## 2024-10-17 NOTE — ED PROVIDER NOTES
[facility] EMERGENCY DEPT  EMERGENCY DEPARTMENT ENCOUNTER      Pt Name: Amber Tolentino  MRN: [id number]  Birthdate 2000  Date of evaluation: 10/17/2024  Provider: Arabella Lowe MD    CHIEF COMPLAINT       Chief Complaint   Patient presents with    Vaginal Bleeding         HISTORY OF PRESENT ILLNESS   (Location/Symptom, Timing/Onset, Context/Setting, Quality, Duration, Modifying Factors, Severity)  Note limiting factors.   HPI  25 yo female with hx of anemia presenting for vaginal bleeding.   Pt reports nexplanon implanted after she gave birth, 2023, a year ago and had no period for a year until 3.5 months ago she started having vaginal bleeding that has not stopped. Pt reports 3 pads at most per day, not heavy bleeding or large clots. Reports occasional cramps, non currently.  Denies fevers or chills. Pt reports she has not had any lightheadedness, heart racing, fainting. Pt requesting nexplanon removal in ED. Also reports mild headache, ongoing for 3 months, stable, taking tylenol at home. Pt reports no vision changes, numbness, or focal weakness.     Review of External Medical Records:         Nursing Notes were reviewed.      REVIEW OF SYSTEMS    (2-9 systems for level 4, 10 or more for level 5)     Except as noted above the remainder of the review of systems was reviewed and negative.       PAST MEDICAL HISTORY     Past Medical History:   Diagnosis Date    Anemia     Nexplanon insertion 2023         SURGICAL HISTORY       Past Surgical History:   Procedure Laterality Date     SECTION       SECTION       SECTION N/A 2023     SECTION performed by Estela Granda MD at [facility] L&D OR         CURRENT MEDICATIONS       Previous Medications    FERROUS SULFATE (IRON 325) 325 (65 FE) MG TABLET    Take 1 tablet by mouth every other day    IBUPROFEN (ADVIL;MOTRIN) 800 MG TABLET    Take 1 tablet by mouth every 8 hours as needed for Pain    LANCETS

## 2024-10-17 NOTE — ED TRIAGE NOTES
Patient arrives to the ED for complaints of vaginal bleeding and headache for ~3 months. Patient states she started her menstrual cycle and it has not stopped.     Patient reports she had a birth control implant placed in June 2023. Reports she has not had a period since then until now.     Denies abdominal pain, N/V/D.

## 2024-10-29 ENCOUNTER — OFFICE VISIT (OUTPATIENT)
Age: 24
End: 2024-10-29
Payer: MEDICAID

## 2024-10-29 VITALS
DIASTOLIC BLOOD PRESSURE: 55 MMHG | SYSTOLIC BLOOD PRESSURE: 95 MMHG | WEIGHT: 138.2 LBS | HEART RATE: 80 BPM | HEIGHT: 61 IN | BODY MASS INDEX: 26.09 KG/M2 | TEMPERATURE: 99.7 F

## 2024-10-29 DIAGNOSIS — Z30.46 ENCOUNTER FOR NEXPLANON REMOVAL: Primary | ICD-10-CM

## 2024-10-29 PROCEDURE — 11976 REMOVE CONTRACEPTIVE CAPSULE: CPT | Performed by: STUDENT IN AN ORGANIZED HEALTH CARE EDUCATION/TRAINING PROGRAM

## 2024-10-29 PROCEDURE — 11982 REMOVE DRUG IMPLANT DEVICE: CPT | Performed by: STUDENT IN AN ORGANIZED HEALTH CARE EDUCATION/TRAINING PROGRAM

## 2024-10-29 RX ORDER — LIDOCAINE HYDROCHLORIDE AND EPINEPHRINE 10; 10 MG/ML; UG/ML
5 INJECTION, SOLUTION INFILTRATION; PERINEURAL ONCE
Status: COMPLETED | OUTPATIENT
Start: 2024-10-29 | End: 2024-10-29

## 2024-10-29 RX ADMIN — LIDOCAINE HYDROCHLORIDE AND EPINEPHRINE 5 ML: 10; 10 INJECTION, SOLUTION INFILTRATION; PERINEURAL at 14:50

## 2024-10-29 ASSESSMENT — PATIENT HEALTH QUESTIONNAIRE - PHQ9
1. LITTLE INTEREST OR PLEASURE IN DOING THINGS: NOT AT ALL
2. FEELING DOWN, DEPRESSED OR HOPELESS: NOT AT ALL
SUM OF ALL RESPONSES TO PHQ QUESTIONS 1-9: 0
SUM OF ALL RESPONSES TO PHQ QUESTIONS 1-9: 0
SUM OF ALL RESPONSES TO PHQ9 QUESTIONS 1 & 2: 0
SUM OF ALL RESPONSES TO PHQ QUESTIONS 1-9: 0
SUM OF ALL RESPONSES TO PHQ QUESTIONS 1-9: 0

## 2024-10-29 NOTE — PROGRESS NOTES
Amber Tolentino is a 24 y.o. female      Chief Complaint   Patient presents with    Vaginal Bleeding     stopped after getting 5 days of birth control pills but continues to have headaches.  Nexplanon removal.        \"Have you been to the ER, urgent care clinic since your last visit?  Hospitalized since your last visit?\"    YES - When: approximately 2  weeks ago.  Where and Why: [facility] ED for vaginal bleeding.    “Have you seen or consulted any other health care providers outside of [facility] since your last visit?”    NO            Click Here for Release of Records Request    Vitals:    10/29/24 1345   BP: (!) 95/55   Site: Right Upper Arm   Position: Sitting   Cuff Size: Medium Adult   Pulse: 80   Temp: 99.7 °F (37.6 °C)   TempSrc: Oral   Weight: 62.7 kg (138 lb 3.2 oz)   Height: 1.549 m (5' 0.98\")   PF: 96 L/min           Medication Reconciliation Completed, changes notes. Please Update medication list.

## 2024-10-30 NOTE — PROGRESS NOTES
OFFICE PROCEDURE PROGRESS NOTE        Chart reviewed for the following:   Fidencio MORTENSEN MD, have reviewed the History, Physical and updated the Allergic reactions for Amber Tolentino     TIME OUT performed immediately prior to start of procedure:   Fidencio MORTENSEN MD, have performed the following reviews on Amber Tolentino prior to the start of the procedure:            * Patient was identified by name and date of birth   * Agreement on procedure being performed was verified  * Risks and Benefits explained to the patient  * Procedure site verified and marked as necessary  * Patient was positioned for comfort  * Consent was signed and verified     Time: 3:10 PM    Date of procedure: 10/30/2024    Procedure performed by:  Fidencio Weaver MD    Provider assisted by: Geraldine Bell    Patient assisted by: self    How tolerated by patient: well    Post Procedural Pain Scale: 1/10    Comments: none    Nexplanon Removal Procedure Note  PRE-OP DIAGNOSIS: Nexplanon, desire for removal   POST-OP DIAGNOSIS: Same   PROCEDURE: Nexplanon Removal   PROCEDURE:   Anesthesia: 1% Lidocaine w/ epinephrine 5 ml   Procedure: Consent obtained. A time-out was performed prior to initiating procedure to be sure of right patient and right location. The area surrounding the Nexplanon was prepared with Choloraprep and draped in the usual sterile manner. The site was anesthetized with lidocaine. A skin incision was made over the distal aspect of the device. The capsule lysed sharply and the device removed using a hemostat. Hemostasis was assured. The site was dressed with SteriStrips, bandaid and a pressure dressing. The patient tolerated the procedure well.     Followup: The patient tolerated the procedure well without complications. Standard post-procedure care is explained and return precautions are given. Contraception is advised until conception is desired.    Fidencio Weaver MD, MPH  [place]

## 2024-11-20 NOTE — PROGRESS NOTES
CS scheduled 6/5 at 730  Advised patient to be here at 530     Baby moving  Rash on abdomen c/w PUPPPS - patient says doing okay does not need meds FAMILY HISTORY:  FH: HTN (hypertension), mother and father  FH: hyperlipidemia, mother and father    Father  Still living? No  FH: lung cancer, Age at diagnosis: Age Unknown    Sibling  Still living? Yes, Estimated age: Age Unknown  FH: uterine cancer, Age at diagnosis: Age Unknown

## (undated) DEVICE — POOLE SUCTION INSTRUMENT WITH REMOVABLE SHEATH: Brand: POOLE

## (undated) DEVICE — CATHETER URIN 16FR 30CC BLLN 2 W F LUBRI-SIL IC

## (undated) DEVICE — C-SECTION II-LF: Brand: MEDLINE INDUSTRIES, INC.

## (undated) DEVICE — STERILE LATEX POWDER-FREE SURGICAL GLOVESWITH NITRILE COATING: Brand: PROTEXIS

## (undated) DEVICE — INTENT OT USE PROVIDES A STERILE INTERFACE BETWEEN THE OPERATING ROOM SURGICAL LAMPS (NON-STERILE) AND THE SURGEON OR STAFF WORKING IN THE STERILE FIELD.: Brand: ASPEN® ALC PLUS LIGHT HANDLE COVER

## (undated) DEVICE — APPLICATOR MEDICATED 3 CC SOLUTION HI LT ORNG CHLORAPREP

## (undated) DEVICE — SUTURE VCRL SZ 0 L36IN ABSRB VLT L40MM CT 1/2 CIR J358H

## (undated) DEVICE — DRESSING BORDERED ADH GZ UNIV GEN USE 8INX4IN AND 6INX2IN

## (undated) DEVICE — PENCIL ES L3M ROCK SWCH S STL HEX LOK BLDE ELECTRD HOLSTER

## (undated) DEVICE — STAPLER SKIN H3.9MM WIRE DIA0.58MM CRWN 6.9MM 35 STPL ROT

## (undated) DEVICE — CLEANER ES TIP W2XL2IN ADH BK RADPQ FOR S STL ELECTRD

## (undated) DEVICE — TRAY,URINE METER,100% SILICONE,16FR10ML: Brand: MEDLINE

## (undated) DEVICE — CONNEXT SURGICAL MATRIX - SMALL SYRINGE: Brand: CONNEXT SURGICAL MATRIX

## (undated) DEVICE — 3000CC GUARDIAN II: Brand: GUARDIAN

## (undated) DEVICE — SYRINGE IRRIG 60ML SFT PLIABLE BLB EZ TO GRP 1 HND USE W/

## (undated) DEVICE — TRAY PREP DRY W/ PREM GLV 2 APPL 6 SPNG 2 UNDPD 1 OVERWRAP

## (undated) DEVICE — CONNEXT SURGICAL MATRIX - LARGE SYRINGE: Brand: CONNEXT SURGICAL MATRIX

## (undated) DEVICE — TOWEL,OR,DSP,ST,BLUE,STD,2/PK,40PK/CS: Brand: MEDLINE

## (undated) DEVICE — GARMENT,MEDLINE,DVT,INT,CALF,MED, GEN2: Brand: MEDLINE

## (undated) DEVICE — ELECTRODE PT RET AD L9FT HI MOIST COND ADH HYDRGEL CORDED

## (undated) DEVICE — SOLUTION IV 1000ML 0.9% SOD CHL

## (undated) DEVICE — SOLIDIFIER FLUID 3000 CC ABSORB

## (undated) DEVICE — SUTURE PDS II SZ 1 L36IN ABSRB VLT CT L40MM 1/2 CIR TAPR Z359T

## (undated) DEVICE — SPONGE LAPAROTOMY W18XL18IN WHITE STRUNG RADIOPAQUE STERILE

## (undated) DEVICE — 3M™ MEDIPORE™ H SOFT CLOTH SURGICAL TAPE, 2863, 3 IN X 10 YD, 12/CASE: Brand: 3M™ MEDIPORE™

## (undated) DEVICE — BLADE CLIPPER GEN PURP NS